# Patient Record
Sex: MALE | Race: WHITE | Employment: OTHER | URBAN - METROPOLITAN AREA
[De-identification: names, ages, dates, MRNs, and addresses within clinical notes are randomized per-mention and may not be internally consistent; named-entity substitution may affect disease eponyms.]

---

## 2023-08-22 ENCOUNTER — APPOINTMENT (OUTPATIENT)
Dept: GENERAL RADIOLOGY | Age: 83
DRG: 871 | End: 2023-08-22
Payer: MEDICARE

## 2023-08-22 ENCOUNTER — APPOINTMENT (OUTPATIENT)
Dept: CT IMAGING | Age: 83
DRG: 871 | End: 2023-08-22
Payer: MEDICARE

## 2023-08-22 ENCOUNTER — HOSPITAL ENCOUNTER (INPATIENT)
Age: 83
LOS: 2 days | Discharge: HOSPICE | DRG: 871 | End: 2023-08-24
Attending: STUDENT IN AN ORGANIZED HEALTH CARE EDUCATION/TRAINING PROGRAM | Admitting: INTERNAL MEDICINE
Payer: MEDICARE

## 2023-08-22 DIAGNOSIS — N17.9 ACUTE RENAL FAILURE, UNSPECIFIED ACUTE RENAL FAILURE TYPE (HCC): Primary | ICD-10-CM

## 2023-08-22 DIAGNOSIS — I48.91 ATRIAL FIBRILLATION WITH RVR (HCC): ICD-10-CM

## 2023-08-22 DIAGNOSIS — T83.511A URINARY TRACT INFECTION ASSOCIATED WITH INDWELLING URETHRAL CATHETER, INITIAL ENCOUNTER (HCC): ICD-10-CM

## 2023-08-22 DIAGNOSIS — N39.0 URINARY TRACT INFECTION ASSOCIATED WITH INDWELLING URETHRAL CATHETER, INITIAL ENCOUNTER (HCC): ICD-10-CM

## 2023-08-22 DIAGNOSIS — M86.9 OSTEOMYELITIS, UNSPECIFIED SITE, UNSPECIFIED TYPE (HCC): ICD-10-CM

## 2023-08-22 DIAGNOSIS — A41.9 SEPTICEMIA (HCC): ICD-10-CM

## 2023-08-22 PROBLEM — R65.21 SEPTIC SHOCK (HCC): Status: ACTIVE | Noted: 2023-08-22

## 2023-08-22 LAB
ALBUMIN SERPL-MCNC: 3.6 G/DL (ref 3.4–5)
ALBUMIN/GLOB SERPL: 0.9 {RATIO} (ref 1.1–2.2)
ALP SERPL-CCNC: 94 U/L (ref 40–129)
ALT SERPL-CCNC: 10 U/L (ref 10–40)
ANION GAP SERPL CALCULATED.3IONS-SCNC: 19 MMOL/L (ref 3–16)
ANION GAP SERPL CALCULATED.3IONS-SCNC: 22 MMOL/L (ref 3–16)
AST SERPL-CCNC: 22 U/L (ref 15–37)
BACTERIA URNS QL MICRO: ABNORMAL /HPF
BASE EXCESS BLDV CALC-SCNC: -6.4 MMOL/L (ref -3–3)
BASOPHILS # BLD: 0.1 K/UL (ref 0–0.2)
BASOPHILS NFR BLD: 0.3 %
BILIRUB SERPL-MCNC: 0.5 MG/DL (ref 0–1)
BILIRUB UR QL STRIP.AUTO: ABNORMAL
BUN SERPL-MCNC: 111 MG/DL (ref 7–20)
BUN SERPL-MCNC: 123 MG/DL (ref 7–20)
CALCIUM SERPL-MCNC: 9.5 MG/DL (ref 8.3–10.6)
CALCIUM SERPL-MCNC: 9.9 MG/DL (ref 8.3–10.6)
CHLORIDE SERPL-SCNC: 106 MMOL/L (ref 99–110)
CHLORIDE SERPL-SCNC: 107 MMOL/L (ref 99–110)
CK SERPL-CCNC: 235 U/L (ref 39–308)
CLARITY UR: ABNORMAL
CO2 BLDV-SCNC: 43 MMOL/L
CO2 SERPL-SCNC: 15 MMOL/L (ref 21–32)
CO2 SERPL-SCNC: 19 MMOL/L (ref 21–32)
COARSE GRAN CASTS #/AREA URNS LPF: ABNORMAL /LPF (ref 0–2)
COHGB MFR BLDV: 3.8 % (ref 0–1.5)
COLOR UR: ABNORMAL
CREAT SERPL-MCNC: 6 MG/DL (ref 0.8–1.3)
CREAT SERPL-MCNC: 6.4 MG/DL (ref 0.8–1.3)
CRP SERPL-MCNC: 102.6 MG/L (ref 0–5.1)
CRYSTALS URNS MICRO: ABNORMAL /HPF
DEPRECATED RDW RBC AUTO: 16.8 % (ref 12.4–15.4)
DO-HGB MFR BLDV: 3 %
EKG ATRIAL RATE: 127 BPM
EKG DIAGNOSIS: NORMAL
EKG Q-T INTERVAL: 392 MS
EKG QRS DURATION: 102 MS
EKG QTC CALCULATION (BAZETT): 474 MS
EKG R AXIS: -24 DEGREES
EKG T AXIS: 119 DEGREES
EKG VENTRICULAR RATE: 88 BPM
EOSINOPHIL # BLD: 0 K/UL (ref 0–0.6)
EOSINOPHIL NFR BLD: 0.1 %
EPI CELLS #/AREA URNS HPF: ABNORMAL /HPF (ref 0–5)
ERYTHROCYTE [SEDIMENTATION RATE] IN BLOOD BY WESTERGREN METHOD: 92 MM/HR (ref 0–20)
GENTAMICIN RAND EID SERPL-MCNC: ABNORMAL MG/L
GENTAMICIN TROUGH SERPL-MCNC: >10.1 UG/ML
GFR SERPLBLD CREATININE-BSD FMLA CKD-EPI: 8 ML/MIN/{1.73_M2}
GFR SERPLBLD CREATININE-BSD FMLA CKD-EPI: 9 ML/MIN/{1.73_M2}
GLUCOSE SERPL-MCNC: 152 MG/DL (ref 70–99)
GLUCOSE SERPL-MCNC: 156 MG/DL (ref 70–99)
GLUCOSE UR STRIP.AUTO-MCNC: 100 MG/DL
HCO3 BLDV-SCNC: 18.4 MMOL/L (ref 23–29)
HCT VFR BLD AUTO: 41.2 % (ref 40.5–52.5)
HGB BLD-MCNC: 13.1 G/DL (ref 13.5–17.5)
HGB UR QL STRIP.AUTO: ABNORMAL
INR PPP: 2.45 (ref 0.84–1.16)
KETONES UR STRIP.AUTO-MCNC: ABNORMAL MG/DL
LACTATE BLDV-SCNC: 1.6 MMOL/L (ref 0.4–1.9)
LEUKOCYTE ESTERASE UR QL STRIP.AUTO: ABNORMAL
LIPASE SERPL-CCNC: 63 U/L (ref 13–60)
LYMPHOCYTES # BLD: 1.1 K/UL (ref 1–5.1)
LYMPHOCYTES NFR BLD: 3.6 %
MAGNESIUM SERPL-MCNC: 3.1 MG/DL (ref 1.8–2.4)
MCH RBC QN AUTO: 30.8 PG (ref 26–34)
MCHC RBC AUTO-ENTMCNC: 31.8 G/DL (ref 31–36)
MCV RBC AUTO: 96.6 FL (ref 80–100)
METHGB MFR BLDV: 0 %
MONOCYTES # BLD: 0.8 K/UL (ref 0–1.3)
MONOCYTES NFR BLD: 2.6 %
NEUTROPHILS # BLD: 28.4 K/UL (ref 1.7–7.7)
NEUTROPHILS NFR BLD: 93.4 %
NITRITE UR QL STRIP.AUTO: NEGATIVE
NT-PROBNP SERPL-MCNC: 1313 PG/ML (ref 0–449)
O2 CT VFR BLDV CALC: 18 VOL %
O2 THERAPY: ABNORMAL
PCO2 BLDV: 33.5 MMHG (ref 40–50)
PH BLDV: 7.35 [PH] (ref 7.35–7.45)
PH UR STRIP.AUTO: 5 [PH] (ref 5–8)
PLATELET # BLD AUTO: 368 K/UL (ref 135–450)
PMV BLD AUTO: 7.9 FL (ref 5–10.5)
PO2 BLDV: 82.6 MMHG (ref 25–40)
POTASSIUM SERPL-SCNC: 4.9 MMOL/L (ref 3.5–5.1)
POTASSIUM SERPL-SCNC: 5.2 MMOL/L (ref 3.5–5.1)
PROT SERPL-MCNC: 7.7 G/DL (ref 6.4–8.2)
PROT UR STRIP.AUTO-MCNC: 300 MG/DL
PROTHROMBIN TIME: 26.5 SEC (ref 11.5–14.8)
RBC # BLD AUTO: 4.26 M/UL (ref 4.2–5.9)
RBC #/AREA URNS HPF: ABNORMAL /HPF (ref 0–4)
SAO2 % BLDV: 97 %
SARS-COV-2 RDRP RESP QL NAA+PROBE: NOT DETECTED
SODIUM SERPL-SCNC: 144 MMOL/L (ref 136–145)
SODIUM SERPL-SCNC: 144 MMOL/L (ref 136–145)
SP GR UR STRIP.AUTO: 1.02 (ref 1–1.03)
TROPONIN, HIGH SENSITIVITY: 105 NG/L (ref 0–22)
TROPONIN, HIGH SENSITIVITY: 108 NG/L (ref 0–22)
TROPONIN, HIGH SENSITIVITY: 142 NG/L (ref 0–22)
UA COMPLETE W REFLEX CULTURE PNL UR: YES
UA DIPSTICK W REFLEX MICRO PNL UR: YES
URATE SERPL-MCNC: 7.3 MG/DL (ref 3.5–7.2)
URN SPEC COLLECT METH UR: ABNORMAL
UROBILINOGEN UR STRIP-ACNC: 1 E.U./DL
WBC # BLD AUTO: 30.5 K/UL (ref 4–11)
WBC #/AREA URNS HPF: ABNORMAL /HPF (ref 0–5)

## 2023-08-22 PROCEDURE — 82570 ASSAY OF URINE CREATININE: CPT

## 2023-08-22 PROCEDURE — 87186 SC STD MICRODIL/AGAR DIL: CPT

## 2023-08-22 PROCEDURE — 87635 SARS-COV-2 COVID-19 AMP PRB: CPT

## 2023-08-22 PROCEDURE — 71045 X-RAY EXAM CHEST 1 VIEW: CPT

## 2023-08-22 PROCEDURE — 85610 PROTHROMBIN TIME: CPT

## 2023-08-22 PROCEDURE — 96365 THER/PROPH/DIAG IV INF INIT: CPT

## 2023-08-22 PROCEDURE — 83880 ASSAY OF NATRIURETIC PEPTIDE: CPT

## 2023-08-22 PROCEDURE — 82803 BLOOD GASES ANY COMBINATION: CPT

## 2023-08-22 PROCEDURE — 80170 ASSAY OF GENTAMICIN: CPT

## 2023-08-22 PROCEDURE — 82436 ASSAY OF URINE CHLORIDE: CPT

## 2023-08-22 PROCEDURE — 36415 COLL VENOUS BLD VENIPUNCTURE: CPT

## 2023-08-22 PROCEDURE — 96367 TX/PROPH/DG ADDL SEQ IV INF: CPT

## 2023-08-22 PROCEDURE — 85025 COMPLETE CBC W/AUTO DIFF WBC: CPT

## 2023-08-22 PROCEDURE — 99285 EMERGENCY DEPT VISIT HI MDM: CPT

## 2023-08-22 PROCEDURE — 2580000003 HC RX 258: Performed by: STUDENT IN AN ORGANIZED HEALTH CARE EDUCATION/TRAINING PROGRAM

## 2023-08-22 PROCEDURE — 84484 ASSAY OF TROPONIN QUANT: CPT

## 2023-08-22 PROCEDURE — 84133 ASSAY OF URINE POTASSIUM: CPT

## 2023-08-22 PROCEDURE — 2500000003 HC RX 250 WO HCPCS: Performed by: STUDENT IN AN ORGANIZED HEALTH CARE EDUCATION/TRAINING PROGRAM

## 2023-08-22 PROCEDURE — 2500000003 HC RX 250 WO HCPCS: Performed by: INTERNAL MEDICINE

## 2023-08-22 PROCEDURE — 87077 CULTURE AEROBIC IDENTIFY: CPT

## 2023-08-22 PROCEDURE — 6360000002 HC RX W HCPCS: Performed by: STUDENT IN AN ORGANIZED HEALTH CARE EDUCATION/TRAINING PROGRAM

## 2023-08-22 PROCEDURE — 86140 C-REACTIVE PROTEIN: CPT

## 2023-08-22 PROCEDURE — 84300 ASSAY OF URINE SODIUM: CPT

## 2023-08-22 PROCEDURE — 83735 ASSAY OF MAGNESIUM: CPT

## 2023-08-22 PROCEDURE — 82550 ASSAY OF CK (CPK): CPT

## 2023-08-22 PROCEDURE — 70450 CT HEAD/BRAIN W/O DYE: CPT

## 2023-08-22 PROCEDURE — 73630 X-RAY EXAM OF FOOT: CPT

## 2023-08-22 PROCEDURE — 6360000002 HC RX W HCPCS: Performed by: INTERNAL MEDICINE

## 2023-08-22 PROCEDURE — 2000000000 HC ICU R&B

## 2023-08-22 PROCEDURE — 87040 BLOOD CULTURE FOR BACTERIA: CPT

## 2023-08-22 PROCEDURE — 83690 ASSAY OF LIPASE: CPT

## 2023-08-22 PROCEDURE — 2580000003 HC RX 258: Performed by: INTERNAL MEDICINE

## 2023-08-22 PROCEDURE — 85652 RBC SED RATE AUTOMATED: CPT

## 2023-08-22 PROCEDURE — 81001 URINALYSIS AUTO W/SCOPE: CPT

## 2023-08-22 PROCEDURE — 80053 COMPREHEN METABOLIC PANEL: CPT

## 2023-08-22 PROCEDURE — 93010 ELECTROCARDIOGRAM REPORT: CPT | Performed by: INTERNAL MEDICINE

## 2023-08-22 PROCEDURE — 02HV33Z INSERTION OF INFUSION DEVICE INTO SUPERIOR VENA CAVA, PERCUTANEOUS APPROACH: ICD-10-PCS | Performed by: INTERNAL MEDICINE

## 2023-08-22 PROCEDURE — 84550 ASSAY OF BLOOD/URIC ACID: CPT

## 2023-08-22 PROCEDURE — 83605 ASSAY OF LACTIC ACID: CPT

## 2023-08-22 PROCEDURE — 87086 URINE CULTURE/COLONY COUNT: CPT

## 2023-08-22 PROCEDURE — 96375 TX/PRO/DX INJ NEW DRUG ADDON: CPT

## 2023-08-22 PROCEDURE — 93005 ELECTROCARDIOGRAM TRACING: CPT | Performed by: STUDENT IN AN ORGANIZED HEALTH CARE EDUCATION/TRAINING PROGRAM

## 2023-08-22 RX ORDER — GENTAMICIN SULFATE 40 MG/ML
80 INJECTION, SOLUTION INTRAMUSCULAR; INTRAVENOUS 3 TIMES DAILY
Status: ON HOLD | COMMUNITY
Start: 2023-08-17 | End: 2023-08-25 | Stop reason: HOSPADM

## 2023-08-22 RX ORDER — NOREPINEPHRINE BITARTRATE 0.06 MG/ML
1-100 INJECTION, SOLUTION INTRAVENOUS CONTINUOUS
Status: DISCONTINUED | OUTPATIENT
Start: 2023-08-22 | End: 2023-08-24 | Stop reason: HOSPADM

## 2023-08-22 RX ORDER — 0.9 % SODIUM CHLORIDE 0.9 %
1382 INTRAVENOUS SOLUTION INTRAVENOUS ONCE
Status: DISCONTINUED | OUTPATIENT
Start: 2023-08-22 | End: 2023-08-22

## 2023-08-22 RX ORDER — SODIUM CHLORIDE 9 MG/ML
INJECTION, SOLUTION INTRAVENOUS PRN
Status: DISCONTINUED | OUTPATIENT
Start: 2023-08-22 | End: 2023-08-24 | Stop reason: HOSPADM

## 2023-08-22 RX ORDER — CLOPIDOGREL BISULFATE 75 MG/1
75 TABLET ORAL DAILY
Status: DISCONTINUED | OUTPATIENT
Start: 2023-08-23 | End: 2023-08-24

## 2023-08-22 RX ORDER — DEXTROSE MONOHYDRATE 25 G/50ML
25 INJECTION, SOLUTION INTRAVENOUS ONCE
Status: DISCONTINUED | OUTPATIENT
Start: 2023-08-22 | End: 2023-08-24

## 2023-08-22 RX ORDER — ONDANSETRON 4 MG/1
4 TABLET, ORALLY DISINTEGRATING ORAL EVERY 8 HOURS PRN
Status: DISCONTINUED | OUTPATIENT
Start: 2023-08-22 | End: 2023-08-24 | Stop reason: HOSPADM

## 2023-08-22 RX ORDER — TRANEXAMIC ACID 100 MG/ML
INJECTION, SOLUTION INTRAVENOUS
Status: DISPENSED
Start: 2023-08-22 | End: 2023-08-23

## 2023-08-22 RX ORDER — POLYETHYLENE GLYCOL 3350 17 G/17G
17 POWDER, FOR SOLUTION ORAL DAILY PRN
Status: DISCONTINUED | OUTPATIENT
Start: 2023-08-22 | End: 2023-08-24 | Stop reason: HOSPADM

## 2023-08-22 RX ORDER — BISACODYL 10 MG
10 SUPPOSITORY, RECTAL RECTAL DAILY PRN
Status: ON HOLD | COMMUNITY
End: 2023-08-25 | Stop reason: HOSPADM

## 2023-08-22 RX ORDER — SODIUM CHLORIDE 0.9 % (FLUSH) 0.9 %
5-40 SYRINGE (ML) INJECTION EVERY 12 HOURS SCHEDULED
Status: DISCONTINUED | OUTPATIENT
Start: 2023-08-22 | End: 2023-08-24 | Stop reason: HOSPADM

## 2023-08-22 RX ORDER — SODIUM CHLORIDE 9 MG/ML
INJECTION, SOLUTION INTRAVENOUS CONTINUOUS
Status: DISCONTINUED | OUTPATIENT
Start: 2023-08-22 | End: 2023-08-22

## 2023-08-22 RX ORDER — POTASSIUM CHLORIDE 750 MG/1
10 CAPSULE, EXTENDED RELEASE ORAL DAILY
Status: ON HOLD | COMMUNITY
End: 2023-08-25 | Stop reason: HOSPADM

## 2023-08-22 RX ORDER — AMMONIUM LACTATE 12 G/100G
CREAM TOPICAL DAILY
Status: ON HOLD | COMMUNITY
End: 2023-08-25 | Stop reason: HOSPADM

## 2023-08-22 RX ORDER — 0.9 % SODIUM CHLORIDE 0.9 %
1000 INTRAVENOUS SOLUTION INTRAVENOUS ONCE
Status: DISCONTINUED | OUTPATIENT
Start: 2023-08-22 | End: 2023-08-24

## 2023-08-22 RX ORDER — AMLODIPINE BESYLATE 10 MG/1
10 TABLET ORAL DAILY
Status: ON HOLD | COMMUNITY
End: 2023-08-25 | Stop reason: HOSPADM

## 2023-08-22 RX ORDER — M-VIT,TX,IRON,MINS/CALC/FOLIC 27MG-0.4MG
1 TABLET ORAL DAILY
Status: ON HOLD | COMMUNITY
End: 2023-08-25 | Stop reason: HOSPADM

## 2023-08-22 RX ORDER — YOHIMBE BARK 500 MG
1 CAPSULE ORAL DAILY
Status: ON HOLD | COMMUNITY
End: 2023-08-25 | Stop reason: HOSPADM

## 2023-08-22 RX ORDER — ONDANSETRON 2 MG/ML
4 INJECTION INTRAMUSCULAR; INTRAVENOUS EVERY 6 HOURS PRN
Status: DISCONTINUED | OUTPATIENT
Start: 2023-08-22 | End: 2023-08-24 | Stop reason: HOSPADM

## 2023-08-22 RX ORDER — CLOPIDOGREL BISULFATE 75 MG/1
75 TABLET ORAL DAILY
Status: ON HOLD | COMMUNITY
End: 2023-08-25 | Stop reason: HOSPADM

## 2023-08-22 RX ORDER — ACETAMINOPHEN 325 MG/1
650 TABLET ORAL EVERY 6 HOURS PRN
Status: DISCONTINUED | OUTPATIENT
Start: 2023-08-22 | End: 2023-08-24 | Stop reason: HOSPADM

## 2023-08-22 RX ORDER — ACETAMINOPHEN 325 MG/1
650 TABLET ORAL EVERY 4 HOURS PRN
Status: ON HOLD | COMMUNITY
End: 2023-08-25 | Stop reason: HOSPADM

## 2023-08-22 RX ORDER — GABAPENTIN 800 MG/1
800 TABLET ORAL 3 TIMES DAILY
Status: ON HOLD | COMMUNITY
End: 2023-08-25 | Stop reason: HOSPADM

## 2023-08-22 RX ORDER — LINEZOLID 2 MG/ML
600 INJECTION, SOLUTION INTRAVENOUS EVERY 12 HOURS
Status: DISCONTINUED | OUTPATIENT
Start: 2023-08-23 | End: 2023-08-24

## 2023-08-22 RX ORDER — ALLOPURINOL 300 MG/1
300 TABLET ORAL DAILY
Status: ON HOLD | COMMUNITY
End: 2023-08-25 | Stop reason: HOSPADM

## 2023-08-22 RX ORDER — SPIRONOLACTONE 25 MG/1
25 TABLET ORAL DAILY
Status: ON HOLD | COMMUNITY
End: 2023-08-25 | Stop reason: HOSPADM

## 2023-08-22 RX ORDER — FAMOTIDINE 20 MG/1
20 TABLET, FILM COATED ORAL DAILY
Status: ON HOLD | COMMUNITY
End: 2023-08-25 | Stop reason: HOSPADM

## 2023-08-22 RX ORDER — FUROSEMIDE 40 MG/1
40 TABLET ORAL 2 TIMES DAILY
Status: ON HOLD | COMMUNITY
End: 2023-08-25 | Stop reason: HOSPADM

## 2023-08-22 RX ORDER — SODIUM CHLORIDE 0.9 % (FLUSH) 0.9 %
5-40 SYRINGE (ML) INJECTION PRN
Status: DISCONTINUED | OUTPATIENT
Start: 2023-08-22 | End: 2023-08-24 | Stop reason: HOSPADM

## 2023-08-22 RX ORDER — LORATADINE 10 MG/1
10 TABLET ORAL DAILY
Status: ON HOLD | COMMUNITY
End: 2023-08-25 | Stop reason: HOSPADM

## 2023-08-22 RX ORDER — VANCOMYCIN HYDROCHLORIDE 1 G/200ML
1000 INJECTION, SOLUTION INTRAVENOUS ONCE
Status: DISCONTINUED | OUTPATIENT
Start: 2023-08-22 | End: 2023-08-22

## 2023-08-22 RX ORDER — HYDROXYZINE HYDROCHLORIDE 25 MG/1
25 TABLET, FILM COATED ORAL 3 TIMES DAILY PRN
Status: ON HOLD | COMMUNITY
End: 2023-08-25 | Stop reason: HOSPADM

## 2023-08-22 RX ORDER — ACETAMINOPHEN 650 MG/1
650 SUPPOSITORY RECTAL EVERY 6 HOURS PRN
Status: DISCONTINUED | OUTPATIENT
Start: 2023-08-22 | End: 2023-08-24 | Stop reason: HOSPADM

## 2023-08-22 RX ORDER — DEXTROSE MONOHYDRATE 100 MG/ML
INJECTION, SOLUTION INTRAVENOUS CONTINUOUS PRN
Status: DISCONTINUED | OUTPATIENT
Start: 2023-08-22 | End: 2023-08-24 | Stop reason: HOSPADM

## 2023-08-22 RX ORDER — 0.9 % SODIUM CHLORIDE 0.9 %
1000 INTRAVENOUS SOLUTION INTRAVENOUS ONCE
Status: COMPLETED | OUTPATIENT
Start: 2023-08-22 | End: 2023-08-22

## 2023-08-22 RX ORDER — DILTIAZEM HYDROCHLORIDE 5 MG/ML
10 INJECTION INTRAVENOUS ONCE
Status: COMPLETED | OUTPATIENT
Start: 2023-08-22 | End: 2023-08-22

## 2023-08-22 RX ADMIN — Medication 5 MCG/MIN: at 14:52

## 2023-08-22 RX ADMIN — CEFTRIAXONE SODIUM 1000 MG: 1 INJECTION, POWDER, FOR SOLUTION INTRAMUSCULAR; INTRAVENOUS at 13:58

## 2023-08-22 RX ADMIN — DILTIAZEM HYDROCHLORIDE 10 MG: 5 INJECTION, SOLUTION INTRAVENOUS at 15:50

## 2023-08-22 RX ADMIN — AMIODARONE HYDROCHLORIDE 1 MG/MIN: 50 INJECTION, SOLUTION INTRAVENOUS at 19:05

## 2023-08-22 RX ADMIN — SODIUM BICARBONATE: 84 INJECTION, SOLUTION INTRAVENOUS at 19:00

## 2023-08-22 RX ADMIN — SODIUM CHLORIDE 1000 ML: 9 INJECTION, SOLUTION INTRAVENOUS at 12:45

## 2023-08-22 RX ADMIN — CALCIUM GLUCONATE 1000 MG: 98 INJECTION, SOLUTION INTRAVENOUS at 15:36

## 2023-08-22 RX ADMIN — VANCOMYCIN HYDROCHLORIDE 1250 MG: 1.25 INJECTION, POWDER, LYOPHILIZED, FOR SOLUTION INTRAVENOUS at 17:03

## 2023-08-22 RX ADMIN — DEXTROSE MONOHYDRATE 150 MG: 50 INJECTION, SOLUTION INTRAVENOUS at 18:38

## 2023-08-22 NOTE — CONSULTS
MD Ponce Lentz MD Normie Poncho, MD                Office: [phone]                      Fax: [phone]               [email or web address]                     Nephrology consult received. Full consult report will follow. Chart reviewed briefly, discussed with referring Dr. Robert Medina from ER. Initial-brief plan-  - Needs aggressive IV fluids,  - Refer to the orders for, for normal saline  - Levophed, to keep SBP >90 or MAP >65*   -Empiric antibiotics,- IV antibiotic: Vancomycin: trough goal <15, pharmacy team assisting.   -Follow-up blood culture, urine culture  -check urine lytes,  -phelps insertion needed for strict monitoring in critically ill patient  -no need for dialysis,    -at higher risk for decompensation, needing closer monitoring. Thank you for allowing us to participate in this patient's care. Please do not hesitate to contact us anytime. We will follow along with you. Mitchell Laguerre MD  [facility] [address]   [phone] or [employer].

## 2023-08-22 NOTE — H&P
HOSPITALISTS HISTORY AND PHYSICAL    8/22/2023 5:34 PM    Patient Information:  Marky Berrios is a 80 y.o. male [id number]  PCP:  No primary care provider on file. (Tel: None )    Chief complaint:    Chief Complaint   Patient presents with    Hypotension     Pt brought in by EMS from [facility]. Pt sent for low BP. Pt unknown mental status. History of Present Illness:  Marky Berrios is a 80 y.o. male who who was brought to hospital from nursing home for hypotension. Patient is nonverbal when I see him is moving all extremities. Per ED provider patient is baseline with dementia was speaks some words. Complaint usual possible UTI and hospitalized of the left foot patient is in septic shock started on pressors central line has been placed. ED provider did talk to family and states patient is a DNR CCA. REVIEW OF SYSTEMS:   Patient uable to answer . Past Medical History:  Afib, dementia         Medications:  No current facility-administered medications on file prior to encounter. Current Outpatient Medications on File Prior to Encounter   Medication Sig Dispense Refill    Lactobacillus (ACIDOPHILUS) 100 MG CAPS Take 1 capsule by mouth daily      allopurinol (ZYLOPRIM) 300 MG tablet Take 1 tablet by mouth daily      ammonium lactate (AMLACTIN) 12 % cream Apply topically daily Apply to left leg (not wound) topically one time per day.       amLODIPine (NORVASC) 10 MG tablet Take 1 tablet by mouth daily      clopidogrel (PLAVIX) 75 MG tablet Take 1 tablet by mouth daily      bisacodyl (DULCOLAX) 10 MG suppository Place 1 suppository rectally daily as needed for Constipation      apixaban (ELIQUIS) 5 MG TABS tablet Take 1 tablet by mouth 2 times daily      famotidine (PEPCID) 20 MG tablet Take 1 tablet by mouth daily      furosemide (LASIX) 40 MG tablet Take 1 tablet by mouth in the morning and 1 tablet in the

## 2023-08-22 NOTE — PROGRESS NOTES
Patient seen in ED, room 02. Admission Initiated but completed to and through Home Medications. Admission was completed based off paperwork provided by [facility]. The floor RN will need to complete the other required assessment items starting at Belongings in addition to the 4 Eyes Assessment, Immunizations, Covid Vaccines, Rights and Responsibilities, orientation to room, Plan of Care, Education/Learning Assessment, Education Plan, white board, height and weight, pain assessment and head to toe assessment. .  Patient is from [facility] and is being admitted for Septic Shock. Home Medication Reconciliation has been modified to match the paperwork provided by [facility] and has been completed via pharmacist Parag Mcclain. No family at bedside at this time. Per [facility] patient is on a Regular Diet with double protein portions. Patient is a DNR CCA.

## 2023-08-22 NOTE — PROGRESS NOTES
Pharmacy Home Medication Reconciliation Note    A medication reconciliation has been completed for Flor Lloyd 1940    [facility]    The patient's home medication list is as follows: No current facility-administered medications on file prior to encounter. Current Outpatient Medications on File Prior to Encounter   Medication Sig Dispense Refill    Lactobacillus (ACIDOPHILUS) 100 MG CAPS Take 1 capsule by mouth daily      allopurinol (ZYLOPRIM) 300 MG tablet Take 1 tablet by mouth daily      ammonium lactate (AMLACTIN) 12 % cream Apply topically daily Apply to left leg (not wound) topically one time per day. amLODIPine (NORVASC) 10 MG tablet Take 1 tablet by mouth daily      clopidogrel (PLAVIX) 75 MG tablet Take 1 tablet by mouth daily      bisacodyl (DULCOLAX) 10 MG suppository Place 1 suppository rectally daily as needed for Constipation      apixaban (ELIQUIS) 5 MG TABS tablet Take 1 tablet by mouth 2 times daily      famotidine (PEPCID) 20 MG tablet Take 1 tablet by mouth daily      furosemide (LASIX) 40 MG tablet Take 1 tablet by mouth in the morning and 1 tablet in the evening.      gabapentin (NEURONTIN) 800 MG tablet Take 1 tablet by mouth 3 times daily.       gentamicin (GARAMYCIN) 40 MG/ML injection Inject 2 mLs into the muscle in the morning, at noon, and at bedtime      hydrOXYzine HCl (ATARAX) 25 MG tablet Take 1 tablet by mouth 3 times daily as needed for Itching      loratadine (CLARITIN) 10 MG tablet Take 1 tablet by mouth daily      magnesium hydroxide (MILK OF MAGNESIA) 400 MG/5ML suspension Take 30 mLs by mouth daily as needed for Constipation      Multiple Vitamins-Minerals (THERAPEUTIC MULTIVITAMIN-MINERALS) tablet Take 1 tablet by mouth daily      potassium chloride (MICRO-K) 10 MEQ extended release capsule Take 1 capsule by mouth daily      collagenase (SANTYL) 250 UNIT/GM ointment Apply topically at bedtime Apply to right heel topically every night. spironolactone (ALDACTONE) 25 MG tablet Take 1 tablet by mouth daily      acetaminophen (TYLENOL) 325 MG tablet Take 2 tablets by mouth every 4 hours as needed for Pain         Of note, patient is on gentamicin 80mg IM TID x10 days, to end on 8/26. Timing of last doses updated.     Thank you,  Tiffany Fletcher, PharmD, BCCCP

## 2023-08-23 PROBLEM — M86.9 OSTEOMYELITIS (HCC): Status: ACTIVE | Noted: 2023-08-23

## 2023-08-23 PROBLEM — I10 ESSENTIAL HYPERTENSION: Status: ACTIVE | Noted: 2023-08-23

## 2023-08-23 PROBLEM — E66.3 OVERWEIGHT (BMI 25.0-29.9): Status: ACTIVE | Noted: 2023-08-23

## 2023-08-23 PROBLEM — N17.9 ACUTE RENAL FAILURE (HCC): Status: ACTIVE | Noted: 2023-08-23

## 2023-08-23 PROBLEM — N39.0 URINARY TRACT INFECTION ASSOCIATED WITH INDWELLING URETHRAL CATHETER (HCC): Status: ACTIVE | Noted: 2023-08-23

## 2023-08-23 PROBLEM — E78.2 MIXED HYPERLIPIDEMIA: Status: ACTIVE | Noted: 2023-08-23

## 2023-08-23 PROBLEM — E11.628 TYPE 2 DIABETES MELLITUS WITH LEFT DIABETIC FOOT INFECTION (HCC): Status: ACTIVE | Noted: 2023-08-23

## 2023-08-23 PROBLEM — R79.82 CRP ELEVATED: Status: ACTIVE | Noted: 2023-08-23

## 2023-08-23 PROBLEM — E87.29 HIGH ANION GAP METABOLIC ACIDOSIS: Status: ACTIVE | Noted: 2023-08-23

## 2023-08-23 PROBLEM — I48.20 CHRONIC ATRIAL FIBRILLATION (HCC): Status: ACTIVE | Noted: 2023-08-23

## 2023-08-23 PROBLEM — J44.9 CHRONIC OBSTRUCTIVE PULMONARY DISEASE (HCC): Status: ACTIVE | Noted: 2023-08-23

## 2023-08-23 PROBLEM — I48.91 ATRIAL FIBRILLATION WITH RVR (HCC): Status: ACTIVE | Noted: 2023-08-23

## 2023-08-23 PROBLEM — T83.511A URINARY TRACT INFECTION ASSOCIATED WITH INDWELLING URETHRAL CATHETER (HCC): Status: ACTIVE | Noted: 2023-08-23

## 2023-08-23 PROBLEM — N40.1 BENIGN PROSTATIC HYPERPLASIA WITH LOWER URINARY TRACT SYMPTOMS: Status: ACTIVE | Noted: 2023-08-23

## 2023-08-23 PROBLEM — L08.9 TYPE 2 DIABETES MELLITUS WITH LEFT DIABETIC FOOT INFECTION (HCC): Status: ACTIVE | Noted: 2023-08-23

## 2023-08-23 PROBLEM — A41.9 SEPTICEMIA (HCC): Status: ACTIVE | Noted: 2023-08-23

## 2023-08-23 PROBLEM — R70.0 ELEVATED ERYTHROCYTE SEDIMENTATION RATE: Status: ACTIVE | Noted: 2023-08-23

## 2023-08-23 LAB
ALBUMIN SERPL-MCNC: 3.3 G/DL (ref 3.4–5)
ALP SERPL-CCNC: 95 U/L (ref 40–129)
ALT SERPL-CCNC: 10 U/L (ref 10–40)
ANION GAP SERPL CALCULATED.3IONS-SCNC: 14 MMOL/L (ref 3–16)
ANION GAP SERPL CALCULATED.3IONS-SCNC: 18 MMOL/L (ref 3–16)
AST SERPL-CCNC: 19 U/L (ref 15–37)
BASOPHILS # BLD: 0.1 K/UL (ref 0–0.2)
BASOPHILS NFR BLD: 0.4 %
BILIRUB DIRECT SERPL-MCNC: <0.2 MG/DL (ref 0–0.3)
BILIRUB INDIRECT SERPL-MCNC: NORMAL MG/DL (ref 0–1)
BILIRUB SERPL-MCNC: 0.4 MG/DL (ref 0–1)
BUN SERPL-MCNC: 110 MG/DL (ref 7–20)
BUN SERPL-MCNC: 127 MG/DL (ref 7–20)
CALCIUM SERPL-MCNC: 8.3 MG/DL (ref 8.3–10.6)
CALCIUM SERPL-MCNC: 9 MG/DL (ref 8.3–10.6)
CHLORIDE SERPL-SCNC: 103 MMOL/L (ref 99–110)
CHLORIDE SERPL-SCNC: 103 MMOL/L (ref 99–110)
CHLORIDE UR-SCNC: <20 MMOL/L
CO2 SERPL-SCNC: 24 MMOL/L (ref 21–32)
CO2 SERPL-SCNC: 25 MMOL/L (ref 21–32)
CREAT SERPL-MCNC: 6.2 MG/DL (ref 0.8–1.3)
CREAT SERPL-MCNC: 6.5 MG/DL (ref 0.8–1.3)
CREAT UR-MCNC: 157.9 MG/DL (ref 39–259)
DEPRECATED RDW RBC AUTO: 16.9 % (ref 12.4–15.4)
EOSINOPHIL # BLD: 0.3 K/UL (ref 0–0.6)
EOSINOPHIL NFR BLD: 0.7 %
GFR SERPLBLD CREATININE-BSD FMLA CKD-EPI: 8 ML/MIN/{1.73_M2}
GFR SERPLBLD CREATININE-BSD FMLA CKD-EPI: 8 ML/MIN/{1.73_M2}
GLUCOSE SERPL-MCNC: 196 MG/DL (ref 70–99)
GLUCOSE SERPL-MCNC: 204 MG/DL (ref 70–99)
HCT VFR BLD AUTO: 35.8 % (ref 40.5–52.5)
HGB BLD-MCNC: 11.5 G/DL (ref 13.5–17.5)
LV EF: 68 %
LVEF MODALITY: NORMAL
LYMPHOCYTES # BLD: 0.9 K/UL (ref 1–5.1)
LYMPHOCYTES NFR BLD: 2.6 %
MAGNESIUM SERPL-MCNC: 2.7 MG/DL (ref 1.8–2.4)
MCH RBC QN AUTO: 30.7 PG (ref 26–34)
MCHC RBC AUTO-ENTMCNC: 32.3 G/DL (ref 31–36)
MCV RBC AUTO: 95.2 FL (ref 80–100)
MONOCYTES # BLD: 1.4 K/UL (ref 0–1.3)
MONOCYTES NFR BLD: 4.3 %
NEUTROPHILS # BLD: 31.1 K/UL (ref 1.7–7.7)
NEUTROPHILS NFR BLD: 92 %
PHOSPHATE SERPL-MCNC: 3.8 MG/DL (ref 2.5–4.9)
PLATELET # BLD AUTO: 428 K/UL (ref 135–450)
PMV BLD AUTO: 7.4 FL (ref 5–10.5)
POTASSIUM SERPL-SCNC: 3.7 MMOL/L (ref 3.5–5.1)
POTASSIUM SERPL-SCNC: 4.1 MMOL/L (ref 3.5–5.1)
POTASSIUM UR-SCNC: 76.5 MMOL/L
PROT SERPL-MCNC: 6.9 G/DL (ref 6.4–8.2)
RBC # BLD AUTO: 3.76 M/UL (ref 4.2–5.9)
SODIUM SERPL-SCNC: 142 MMOL/L (ref 136–145)
SODIUM SERPL-SCNC: 145 MMOL/L (ref 136–145)
SODIUM UR-SCNC: 26 MMOL/L
WBC # BLD AUTO: 33.8 K/UL (ref 4–11)

## 2023-08-23 PROCEDURE — 80069 RENAL FUNCTION PANEL: CPT

## 2023-08-23 PROCEDURE — 87186 SC STD MICRODIL/AGAR DIL: CPT

## 2023-08-23 PROCEDURE — 99223 1ST HOSP IP/OBS HIGH 75: CPT | Performed by: INTERNAL MEDICINE

## 2023-08-23 PROCEDURE — 6360000002 HC RX W HCPCS: Performed by: INTERNAL MEDICINE

## 2023-08-23 PROCEDURE — 6360000004 HC RX CONTRAST MEDICATION: Performed by: INTERNAL MEDICINE

## 2023-08-23 PROCEDURE — 99291 CRITICAL CARE FIRST HOUR: CPT | Performed by: INTERNAL MEDICINE

## 2023-08-23 PROCEDURE — 87077 CULTURE AEROBIC IDENTIFY: CPT

## 2023-08-23 PROCEDURE — 51702 INSERT TEMP BLADDER CATH: CPT

## 2023-08-23 PROCEDURE — 2580000003 HC RX 258: Performed by: INTERNAL MEDICINE

## 2023-08-23 PROCEDURE — 87070 CULTURE OTHR SPECIMN AEROBIC: CPT

## 2023-08-23 PROCEDURE — 83735 ASSAY OF MAGNESIUM: CPT

## 2023-08-23 PROCEDURE — 87205 SMEAR GRAM STAIN: CPT

## 2023-08-23 PROCEDURE — 2000000000 HC ICU R&B

## 2023-08-23 PROCEDURE — 2500000003 HC RX 250 WO HCPCS: Performed by: INTERNAL MEDICINE

## 2023-08-23 PROCEDURE — 85025 COMPLETE CBC W/AUTO DIFF WBC: CPT

## 2023-08-23 PROCEDURE — C8929 TTE W OR WO FOL WCON,DOPPLER: HCPCS

## 2023-08-23 PROCEDURE — 80076 HEPATIC FUNCTION PANEL: CPT

## 2023-08-23 RX ORDER — 0.9 % SODIUM CHLORIDE 0.9 %
500 INTRAVENOUS SOLUTION INTRAVENOUS ONCE
Status: COMPLETED | OUTPATIENT
Start: 2023-08-23 | End: 2023-08-23

## 2023-08-23 RX ORDER — LACTOBACILLUS RHAMNOSUS GG 10B CELL
1 CAPSULE ORAL 2 TIMES DAILY WITH MEALS
Status: DISCONTINUED | OUTPATIENT
Start: 2023-08-23 | End: 2023-08-24

## 2023-08-23 RX ORDER — SODIUM CHLORIDE 9 MG/ML
INJECTION, SOLUTION INTRAVENOUS CONTINUOUS
Status: DISCONTINUED | OUTPATIENT
Start: 2023-08-23 | End: 2023-08-24 | Stop reason: HOSPADM

## 2023-08-23 RX ORDER — 0.9 % SODIUM CHLORIDE 0.9 %
1000 INTRAVENOUS SOLUTION INTRAVENOUS ONCE
Status: COMPLETED | OUTPATIENT
Start: 2023-08-23 | End: 2023-08-23

## 2023-08-23 RX ADMIN — AMIODARONE HYDROCHLORIDE 0.5 MG/MIN: 50 INJECTION, SOLUTION INTRAVENOUS at 05:00

## 2023-08-23 RX ADMIN — PIPERACILLIN AND TAZOBACTAM 3375 MG: 3; .375 INJECTION, POWDER, LYOPHILIZED, FOR SOLUTION INTRAVENOUS at 13:43

## 2023-08-23 RX ADMIN — SODIUM CHLORIDE: 9 INJECTION, SOLUTION INTRAVENOUS at 13:39

## 2023-08-23 RX ADMIN — SODIUM CHLORIDE 500 ML: 9 INJECTION, SOLUTION INTRAVENOUS at 21:07

## 2023-08-23 RX ADMIN — PIPERACILLIN AND TAZOBACTAM 3375 MG: 3; .375 INJECTION, POWDER, LYOPHILIZED, FOR SOLUTION INTRAVENOUS at 22:41

## 2023-08-23 RX ADMIN — PERFLUTREN 1.5 ML: 6.52 INJECTION, SUSPENSION INTRAVENOUS at 10:24

## 2023-08-23 RX ADMIN — LINEZOLID 600 MG: 600 INJECTION, SOLUTION INTRAVENOUS at 12:11

## 2023-08-23 RX ADMIN — SODIUM CHLORIDE 1000 ML: 9 INJECTION, SOLUTION INTRAVENOUS at 10:50

## 2023-08-23 RX ADMIN — LINEZOLID 600 MG: 600 INJECTION, SOLUTION INTRAVENOUS at 00:15

## 2023-08-23 RX ADMIN — SODIUM BICARBONATE: 84 INJECTION, SOLUTION INTRAVENOUS at 06:09

## 2023-08-23 ASSESSMENT — PAIN SCALES - PAIN ASSESSMENT IN ADVANCED DEMENTIA (PAINAD)
NEGVOCALIZATION: 0
TOTALSCORE: 0
BODYLANGUAGE: 0
FACIALEXPRESSION: 0
BREATHING: 0
CONSOLABILITY: 0

## 2023-08-23 ASSESSMENT — ENCOUNTER SYMPTOMS
SHORTNESS OF BREATH: 0
EYE REDNESS: 0
EYE DISCHARGE: 0
ABDOMINAL PAIN: 0
CONSTIPATION: 0
BACK PAIN: 0
NAUSEA: 0
SINUS PAIN: 0
WHEEZING: 0
COUGH: 0
DIARRHEA: 0
SORE THROAT: 0
SINUS PRESSURE: 0
RHINORRHEA: 0

## 2023-08-23 ASSESSMENT — PAIN SCALES - GENERAL
PAINLEVEL_OUTOF10: 0
PAINLEVEL_OUTOF10: 0

## 2023-08-23 NOTE — PROGRESS NOTES
(mini-bag), 3,375 mg, IntraVENous, Q12H, Dale Delgado MD    lactobacillus (CULTURELLE) capsule 1 capsule, 1 capsule, Oral, BID WC, Dale Delgado MD    norepinephrine (LEVOPHED) 16 mg in sodium chloride 0.9 % 250 mL infusion, 1-100 mcg/min, IntraVENous, Continuous, Naty Black MD, Last Rate: 3.8 mL/hr at 23 1226, 4 mcg/min at 23 1226    sodium bicarbonate 8.4 % injection 50 mEq, 50 mEq, IntraVENous, Once, Alma Chin MD    insulin regular (HUMULIN R;NOVOLIN R) injection 5 Units, 5 Units, IntraVENous, Once, Alma Chin MD    dextrose bolus 10% 125 mL, 125 mL, IntraVENous, PRN **OR** dextrose bolus 10% 250 mL, 250 mL, IntraVENous, PRN, Alma Chin MD    glucagon (rDNA) injection 1 mg, 1 mg, SubCUTAneous, PRN, Alma Chin MD    dextrose 10 % infusion, , IntraVENous, Continuous PRN, Alma Chin MD    dextrose 50 % IV solution, 25 g, IntraVENous, Once, Alma Chin MD    sodium chloride 0.9 % bolus 1,000 mL, 1,000 mL, IntraVENous, Once, Eliana Gordon MD    sodium bicarbonate 150 mEq in dextrose 5 % 1,000 mL infusion, , IntraVENous, Continuous, Eliana Gordon MD, Last Rate: 100 mL/hr at 23 0609, New Bag at 23 0609    [COMPLETED] amiodarone (CORDARONE) 150 mg in dextrose 5 % 100 mL bolus, 150 mg, IntraVENous, Once, Stopped at 23 1859 **FOLLOWED BY** [] amiodarone (CORDARONE) 450 mg in dextrose 5 % 250 mL infusion, 1 mg/min, IntraVENous, Continuous, Last Rate: 16.7 mL/hr at 23 0446, 0.5 mg/min at 23 0446 **FOLLOWED BY** amiodarone (CORDARONE) 450 mg in dextrose 5 % 250 mL infusion, 0.5 mg/min, IntraVENous, Continuous, Eliana Gordon MD, Last Rate: 16.7 mL/hr at 23 0500, 0.5 mg/min at 23 0500    [Held by provider] apixaban (ELIQUIS) tablet 2.5 mg, 2.5 mg, Oral, BID, Eliana Gordon MD    [Held by provider] clopidogrel (PLAVIX) tablet 75 mg, 75 mg, Oral, Daily, Eliana Gordon MD    linezolid (ZYVOX) IVPB 600 found for: LABA1C  RPR:  No results found for: RPR  HIV:  No results found for: HIV  LIZ:  No results found for: ANATITER, LIZ  RF:  No results found for: RF  DSDNA:  No components found for: DNA  AMYLASE:  No results found for: AMYLASE  LIPASE:    Lab Results   Component Value Date/Time    LIPASE 63.0 08/22/2023 12:30 PM     Fibrinogen Level:  No components found for: FIB       BELOW MENTIONED RADIOLOGY STUDY RESULTS BY ME (AS NEEDED FOR MY EVALUATION AND MANAGEMENT). CT HEAD WO CONTRAST    Result Date: 8/22/2023  EXAMINATION: CT OF THE HEAD WITHOUT CONTRAST  8/22/2023 1:20 pm TECHNIQUE: CT of the head was performed without the administration of intravenous contrast. Automated exposure control, iterative reconstruction, and/or weight based adjustment of the mA/kV was utilized to reduce the radiation dose to as low as reasonably achievable. COMPARISON: None. HISTORY: ORDERING SYSTEM PROVIDED HISTORY: AMS TECHNOLOGIST PROVIDED HISTORY: Reason for exam:->AMS Has a \"code stroke\" or \"stroke alert\" been called? ->No Decision Support Exception - unselect if not a suspected or confirmed emergency medical condition->Emergency Medical Condition (MA) Reason for Exam: AMS FINDINGS: BRAIN/VENTRICLES: There is no acute intracranial hemorrhage, mass effect or midline shift. No abnormal extra-axial fluid collection. The gray-white differentiation is maintained without evidence of an acute infarct. There is no evidence of hydrocephalus. SINUSES: The visualized paranasal sinuses and mastoid air cells demonstrate no acute abnormality. Cerumen in the left external auditory canal. SOFT TISSUES/SKULL:  No acute abnormality of the visualized skull or soft tissues. No acute intracranial abnormality. Imaging: [unfilled]         ======================================================================  Please note that this chart entry has been generated using voice recognition software, mainly.   So please excuse brevity

## 2023-08-23 NOTE — CONSULTS
Podiatric surgery consult note        CHIEF COMPLAINT:  \"  Chief Complaint   Patient presents with    Hypotension     Pt brought in by EMS from [facility]. Pt sent for low BP. Pt unknown mental status. \"    Reason for Admission:  Septicemia ([address]) [A41.9]  Atrial fibrillation with RVR ([address]) [I48.91]  Septic shock ([address] ) [A41.9, R65.21]  Acute renal failure, unspecified acute renal failure type ([address] ) [N17.9]  Urinary tract infection associated with indwelling urethral catheter, initial encounter ([address] ) [U71.552R, N39.0]  Osteomyelitis, unspecified site, unspecified type ([address] [facility]) [M86.9]    History Obtained From:  electronic medical record    HISTORY OF PRESENT ILLNESS:      The patient is a 80 y.o. male with significant past medical history listed below who presents with necrotic ulceration.   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 ([address])     Benign prostatic hyperplasia     CHF (congestive heart failure) (HCC)     Chronic gout, unspecified, without tophus (tophi)     COPD (chronic obstructive pulmonary disease) ([address])     Diabetes mellitus due to underlying condition with diabetic neuropathic arthropathy (HCC)     Gastro-esophageal reflux disease without esophagitis     Hyperlipidemia     Hypertension     Longstanding persistent atrial fibrillation (HCC)     Malignant neoplasm of prostate (HCC)     Neuromuscular dysfunction of bladder, unspecified     Obstructive and reflux uropathy     Peripheral vascular disease, unspecified (HCC)     Polyneuropathy, unspecified     Unspecified atrial flutter (HCC)     Unspecified protein-calorie malnutrition (HCC)      Past Surgical History:        Procedure Laterality Date    OTHER SURGICAL HISTORY      acquired abscence of right leg below knee       Current Facility-Administered Medications:     piperacillin-tazobactam (ZOSYN) 3,375 mg in sodium chloride 0.9 % 50 mL IVPB (mini-bag), 3,375 mg, heel ulceration with osteomyelitis  -Definitive treatment is 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 Offloading heel boot. Thanks for the opportunity to participate in this patient's care.      Rk Matta DPM

## 2023-08-23 NOTE — CONSULTS
Infectious Diseases   Consult Note        Admission Date: 8/22/2023  Hospital Day: Hospital Day: 2   Attending: Angy Marti MD  Date of service: 8/23/23     Reason for admission: Septicemia [facility]) [A41.9]  Atrial fibrillation with RVR ([address]) [I48.91]  Septic shock ([address]) [A41.9, R65.21]  Acute renal failure, unspecified acute renal failure type ([address]) [N17.9]  Urinary tract infection associated with indwelling urethral catheter, initial encounter ([address]) [R73.410H, N39.0]  Osteomyelitis, unspecified site, unspecified type [facility]) [M86.9]    Chief complaint/ Reason for consult: Septic shock    Microbiology:        I have reviewed allavailable micro lab data and cultures    Blood culture (2/2) - collected on 8/22/2023: In process    Urine culture  - collected on 8/22/2023: Greater than 100,000 CFU per mL of Enterococcus faecalis      Antibiotics and immunizations:       Current antibiotics: All antibiotics and their doses were reviewed by me    Recent Abx Admin                     linezolid (ZYVOX) IVPB 600 mg (mg) 600 mg New Bag 08/23/23 0015    vancomycin (VANCOCIN) 1,250 mg in sodium chloride 0.9 % 250 mL IVPB (Ahro4Uzy) (mg) 1,250 mg New Bag 08/22/23 1703    cefTRIAXone (ROCEPHIN) 1,000 mg in sodium chloride 0.9 % 50 mL IVPB (mini-bag) (mg) 1,000 mg New Bag 08/22/23 1358                      Immunization History: All immunization history was reviewed by me today. There is no immunization history on file for this patient. Known drug allergies: All allergies were reviewed and updated    No Known Allergies    Social history:     Social History:  All social andepidemiologic history was reviewed and updated by me today as needed. Tobacco use:   reports that he has been smoking cigarettes. He does not have any smokeless tobacco history on file. Alcohol use:  Alcohol use questions deferred to the physician. Currently lives in: [facility]  Drug use questions deferred to the physician.      MIKAL to light. Neck:      Thyroid: No thyromegaly. Cardiovascular:      Rate and Rhythm: Normal rate and regular rhythm. Heart sounds: Normal heart sounds. No murmur heard. No friction rub. Pulmonary:      Effort: No respiratory distress. Breath sounds: No stridor. No wheezing or rales. Abdominal:      General: Bowel sounds are normal.      Palpations: Abdomen is soft. Tenderness: There is no abdominal tenderness. There is no guarding or rebound. Musculoskeletal:         General: No swelling, tenderness or deformity. Normal range of motion. Cervical back: Normal range of motion and neck supple. Right lower leg: No edema. Left lower leg: No edema. Lymphadenopathy:      Cervical: No cervical adenopathy. Skin:     General: Skin is warm and dry. Coloration: Skin is not jaundiced. Findings: No bruising, erythema or rash. Comments: Left heel ulceration and foul-smelling discharge noted. 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 Mental Status: He is alert and oriented to person, place, and time. Mental status is at baseline. Motor: No abnormal muscle tone. Psychiatric:         Mood and Affect: Mood normal.         Behavior: Behavior normal.         Lines and drains: All vascular access sites are healthy with no local erythema, discharge or tenderness. Intake and output:     I/O last 3 completed shifts: In: 2646.1 [I.V.:1944.9; IV Piggyback:701.2]  Out: 375 [Urine:375]    Lab Data:   All available labs were reviewed by me today.      CBC:   Recent Labs     08/22/23  1230 08/23/23  0545   WBC 30.5* 33.8*   RBC 4.26 3.76*   HGB 13.1* 11.5*   HCT 41.2 35.8*    428   MCV 96.6 95.2   MCH 30.8 30.7   MCHC 31.8 32.3   RDW 16.8* 16.9*        BMP:  Recent Labs     08/22/23  1230 08/22/23  1724 08/23/23  0545    144 145   K 5.2* 4.9 4.1    107 103

## 2023-08-23 NOTE — CONSULTS
due to patient factors. Minimally verbal.      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)     Benign prostatic hyperplasia     CHF (congestive heart failure) (HCC)     Chronic gout, unspecified, without tophus (tophi)     COPD (chronic obstructive pulmonary disease) ([address])     Diabetes mellitus due to underlying condition with diabetic neuropathic arthropathy (HCC)     Gastro-esophageal reflux disease without esophagitis     Hyperlipidemia     Hypertension     Longstanding persistent atrial fibrillation (HCC)     Malignant neoplasm of prostate (HCC)     Neuromuscular dysfunction of bladder, unspecified     Obstructive and reflux uropathy     Peripheral vascular disease, unspecified (HCC)     Polyneuropathy, unspecified     Unspecified atrial flutter ([address])     Unspecified protein-calorie malnutrition ([address])        PAST SURGICAL HISTORY:   Past Surgical History:   Procedure Laterality Date    OTHER SURGICAL HISTORY      acquired abscence of right leg below knee       SOCIAL HISTORY:   Social History     Tobacco Use    Smoking status: Some Days     Types: Cigarettes   Vaping Use    Vaping Use: Unknown   Substance Use Topics    Alcohol use: Defer    Drug use: Defer       FAMILY HISTORY:   Family History   Family history unknown: Yes       MEDICATIONS:     No current facility-administered medications on file prior to encounter. Current Outpatient Medications on File Prior to Encounter   Medication Sig Dispense Refill    Lactobacillus (ACIDOPHILUS) 100 MG CAPS Take 1 capsule by mouth daily      allopurinol (ZYLOPRIM) 300 MG tablet Take 1 tablet by mouth daily      ammonium lactate (AMLACTIN) 12 % cream Apply topically daily Apply to left leg (not wound) topically one time per day.       amLODIPine (NORVASC) 10 MG tablet Take 1 tablet by mouth daily      clopidogrel (PLAVIX) 75 MG tablet Take 1 tablet by mouth daily      bisacodyl (DULCOLAX) 10 MG addressed with the provider for clarification.

## 2023-08-23 NOTE — CARE COORDINATION
Discharge Planning Note:    CM called and LVM for Jody at [facility] to verify pt's residency status and barriers to returning. Return call back requested.      Electronically signed by Rosa Singleton RN on 8/23/2023 at 9:00 AM

## 2023-08-23 NOTE — CARE COORDINATION
Case Management Assessment  Initial Evaluation    Date/Time of Evaluation: 8/23/2023 3:42 PM  Assessment Completed by: Joao Sanford RN    If patient is discharged prior to next notation, then this note serves as note for discharge by case management. Patient Name: Kai Patel                   YOB: 1940  Diagnosis: Septicemia ([address]) [A41.9]  Atrial fibrillation with RVR ([address]) [I48.91]  Septic shock ([address]) [A41.9, R65.21]  Acute renal failure, unspecified acute renal failure type ([address]) [N17.9]  Urinary tract infection associated with indwelling urethral catheter, initial encounter ([address]) [W56.145F, N39.0]  Osteomyelitis, unspecified site, unspecified type [facility]) [M86.9]                   Date / Time: 8/22/2023 12:13 PM    Patient Admission Status: Inpatient   Readmission Risk (Low < 19, Mod (19-27), High > 27): Readmission Risk Score: 16.8    Current PCP: No primary care provider on file. PCP verified by CM? Yes (facility attending)    Chart Reviewed: Yes      History Provided by: Medical Record, Other (see comment) ([facility])  Patient Orientation: Unable to Assess    Patient Cognition: Dementia / Early Alzheimer's    Hospitalization in the last 30 days (Readmission):  No    If yes, Readmission Assessment in CM Navigator will be completed. Advance Directives:      Code Status: DNR-CCA   Patient's Primary Decision Maker is: Legal Next of Kin    Primary Decision Maker: MiretaNatalie hansen - Spouse - [phone]    Secondary Decision Maker: Glenda Fields - Child - [phone]    Supplemental (Other) Decision Maker: Porter Ramírez - [phone]    Discharge Planning:    Patient expects to discharge to: Long-term care  Plan for transportation at discharge:  Other (see comment) (facility)    Financial    Payor: Carlyn Jo / Plan: [address] [place] [address] / Product Type: *No Product type* /         Current Nursing Home Information:  Patient admitted from:  [address]

## 2023-08-23 NOTE — PROGRESS NOTES
4 Eyes Skin Assessment     NAME:  Guadalupe Iqbal  YOB: 1940  MEDICAL RECORD NUMBER:  [id number]    The patient is being assessed for  Admission    I agree that at least one RN has performed a thorough Head to Toe Skin Assessment on the patient. ALL assessment sites listed below have been assessed. Areas assessed by both nurses:    Head, Face, Ears, Shoulders, Back, Chest, Arms, Elbows, Hands, Sacrum. Buttock, Coccyx, Ischium, Legs. Feet and Heels, and Under Medical Devices         Does the Patient have a Wound? Yes wound(s) were present on assessment.  LDA wound assessment was Initiated and completed by RN       Albert Prevention initiated by RN: Yes  Wound Care Orders initiated by RN: Yes    Pressure Injury (Stage 3,4, Unstageable, DTI, NWPT, and Complex wounds) if present, place Wound referral order by RN under : Yes    New Ostomies, if present place, Ostomy referral order under : No     Nurse 1 eSignature: Electronically signed by Bev Carlin RN on 8/23/23 at 5:03 AM EDT    **SHARE this note so that the co-signing nurse can place an eSignature**    Nurse 2 eSignature: Electronically signed by Preet Vaughn RN on 8/23/23 at 5:03 AM EDT

## 2023-08-24 ENCOUNTER — APPOINTMENT (OUTPATIENT)
Dept: ULTRASOUND IMAGING | Age: 83
DRG: 871 | End: 2023-08-24
Payer: MEDICARE

## 2023-08-24 ENCOUNTER — HOSPITAL ENCOUNTER (INPATIENT)
Age: 83
LOS: 1 days | Discharge: HOSPICE | End: 2023-08-25
Attending: INTERNAL MEDICINE | Admitting: INTERNAL MEDICINE
Payer: MEDICARE

## 2023-08-24 VITALS
DIASTOLIC BLOOD PRESSURE: 80 MMHG | HEIGHT: 67 IN | RESPIRATION RATE: 16 BRPM | OXYGEN SATURATION: 99 % | BODY MASS INDEX: 28.89 KG/M2 | SYSTOLIC BLOOD PRESSURE: 127 MMHG | WEIGHT: 184.08 LBS | TEMPERATURE: 98 F | HEART RATE: 155 BPM

## 2023-08-24 PROBLEM — A41.9 SEPSIS (HCC): Status: ACTIVE | Noted: 2023-08-24

## 2023-08-24 LAB
ALBUMIN SERPL-MCNC: 2.7 G/DL (ref 3.4–5)
ALBUMIN/GLOB SERPL: 0.9 {RATIO} (ref 1.1–2.2)
ALP SERPL-CCNC: 87 U/L (ref 40–129)
ALT SERPL-CCNC: 8 U/L (ref 10–40)
ANION GAP SERPL CALCULATED.3IONS-SCNC: 15 MMOL/L (ref 3–16)
AST SERPL-CCNC: 20 U/L (ref 15–37)
BACTERIA UR CULT: ABNORMAL
BASOPHILS # BLD: 0.1 K/UL (ref 0–0.2)
BASOPHILS NFR BLD: 0.2 %
BILIRUB SERPL-MCNC: 0.7 MG/DL (ref 0–1)
BUN SERPL-MCNC: 100 MG/DL (ref 7–20)
CALCIUM SERPL-MCNC: 8.5 MG/DL (ref 8.3–10.6)
CHLORIDE SERPL-SCNC: 105 MMOL/L (ref 99–110)
CO2 SERPL-SCNC: 24 MMOL/L (ref 21–32)
CREAT SERPL-MCNC: 5.8 MG/DL (ref 0.8–1.3)
DEPRECATED RDW RBC AUTO: 16.1 % (ref 12.4–15.4)
EOSINOPHIL # BLD: 0.8 K/UL (ref 0–0.6)
EOSINOPHIL NFR BLD: 2.8 %
GFR SERPLBLD CREATININE-BSD FMLA CKD-EPI: 9 ML/MIN/{1.73_M2}
GLUCOSE BLD-MCNC: 138 MG/DL (ref 70–99)
GLUCOSE SERPL-MCNC: 163 MG/DL (ref 70–99)
HCT VFR BLD AUTO: 34 % (ref 40.5–52.5)
HGB BLD-MCNC: 11.4 G/DL (ref 13.5–17.5)
LYMPHOCYTES # BLD: 0.5 K/UL (ref 1–5.1)
LYMPHOCYTES NFR BLD: 1.6 %
MAGNESIUM SERPL-MCNC: 2.1 MG/DL (ref 1.8–2.4)
MCH RBC QN AUTO: 31.5 PG (ref 26–34)
MCHC RBC AUTO-ENTMCNC: 33.5 G/DL (ref 31–36)
MCV RBC AUTO: 94.3 FL (ref 80–100)
MONOCYTES # BLD: 0.5 K/UL (ref 0–1.3)
MONOCYTES NFR BLD: 1.8 %
NEUTROPHILS # BLD: 27.9 K/UL (ref 1.7–7.7)
NEUTROPHILS NFR BLD: 93.6 %
ORGANISM: ABNORMAL
PERFORMED ON: ABNORMAL
PHOSPHATE SERPL-MCNC: 3.2 MG/DL (ref 2.5–4.9)
PLATELET # BLD AUTO: 386 K/UL (ref 135–450)
PMV BLD AUTO: 7.3 FL (ref 5–10.5)
POTASSIUM SERPL-SCNC: 3.9 MMOL/L (ref 3.5–5.1)
PROT SERPL-MCNC: 5.8 G/DL (ref 6.4–8.2)
RBC # BLD AUTO: 3.61 M/UL (ref 4.2–5.9)
SODIUM SERPL-SCNC: 144 MMOL/L (ref 136–145)
WBC # BLD AUTO: 29.8 K/UL (ref 4–11)

## 2023-08-24 PROCEDURE — 83735 ASSAY OF MAGNESIUM: CPT

## 2023-08-24 PROCEDURE — 6360000002 HC RX W HCPCS: Performed by: INTERNAL MEDICINE

## 2023-08-24 PROCEDURE — 80053 COMPREHEN METABOLIC PANEL: CPT

## 2023-08-24 PROCEDURE — 99291 CRITICAL CARE FIRST HOUR: CPT | Performed by: INTERNAL MEDICINE

## 2023-08-24 PROCEDURE — 2580000003 HC RX 258: Performed by: INTERNAL MEDICINE

## 2023-08-24 PROCEDURE — 94760 N-INVAS EAR/PLS OXIMETRY 1: CPT

## 2023-08-24 PROCEDURE — 85025 COMPLETE CBC W/AUTO DIFF WBC: CPT

## 2023-08-24 PROCEDURE — 2500000003 HC RX 250 WO HCPCS: Performed by: STUDENT IN AN ORGANIZED HEALTH CARE EDUCATION/TRAINING PROGRAM

## 2023-08-24 PROCEDURE — 1250000000 HC SEMI PRIVATE HOSPICE R&B

## 2023-08-24 PROCEDURE — 2500000003 HC RX 250 WO HCPCS: Performed by: INTERNAL MEDICINE

## 2023-08-24 PROCEDURE — 84100 ASSAY OF PHOSPHORUS: CPT

## 2023-08-24 RX ORDER — HEPARIN SODIUM 1000 [USP'U]/ML
4000 INJECTION, SOLUTION INTRAVENOUS; SUBCUTANEOUS PRN
Status: DISCONTINUED | OUTPATIENT
Start: 2023-08-24 | End: 2023-08-24 | Stop reason: HOSPADM

## 2023-08-24 RX ORDER — MORPHINE SULFATE 4 MG/ML
4 INJECTION, SOLUTION INTRAMUSCULAR; INTRAVENOUS
Status: DISCONTINUED | OUTPATIENT
Start: 2023-08-24 | End: 2023-08-25 | Stop reason: HOSPADM

## 2023-08-24 RX ORDER — MORPHINE SULFATE 4 MG/ML
4 INJECTION, SOLUTION INTRAMUSCULAR; INTRAVENOUS
Status: DISCONTINUED | OUTPATIENT
Start: 2023-08-24 | End: 2023-08-24 | Stop reason: HOSPADM

## 2023-08-24 RX ORDER — DILTIAZEM HYDROCHLORIDE 5 MG/ML
10 INJECTION INTRAVENOUS ONCE
Status: COMPLETED | OUTPATIENT
Start: 2023-08-24 | End: 2023-08-24

## 2023-08-24 RX ORDER — LORAZEPAM 2 MG/ML
4 INJECTION INTRAMUSCULAR
Status: DISCONTINUED | OUTPATIENT
Start: 2023-08-24 | End: 2023-08-25 | Stop reason: HOSPADM

## 2023-08-24 RX ORDER — LORAZEPAM 2 MG/ML
4 INJECTION INTRAMUSCULAR
Status: CANCELLED | OUTPATIENT
Start: 2023-08-24

## 2023-08-24 RX ORDER — LORAZEPAM 2 MG/ML
4 INJECTION INTRAMUSCULAR
Status: DISCONTINUED | OUTPATIENT
Start: 2023-08-24 | End: 2023-08-24 | Stop reason: HOSPADM

## 2023-08-24 RX ORDER — HEPARIN SODIUM 10000 [USP'U]/100ML
5-30 INJECTION, SOLUTION INTRAVENOUS CONTINUOUS
Status: DISCONTINUED | OUTPATIENT
Start: 2023-08-24 | End: 2023-08-24

## 2023-08-24 RX ORDER — MORPHINE SULFATE 4 MG/ML
4 INJECTION, SOLUTION INTRAMUSCULAR; INTRAVENOUS
Status: CANCELLED | OUTPATIENT
Start: 2023-08-24

## 2023-08-24 RX ORDER — HEPARIN SODIUM 1000 [USP'U]/ML
2000 INJECTION, SOLUTION INTRAVENOUS; SUBCUTANEOUS PRN
Status: DISCONTINUED | OUTPATIENT
Start: 2023-08-24 | End: 2023-08-24 | Stop reason: HOSPADM

## 2023-08-24 RX ADMIN — SODIUM CHLORIDE: 9 INJECTION, SOLUTION INTRAVENOUS at 12:06

## 2023-08-24 RX ADMIN — LINEZOLID 600 MG: 600 INJECTION, SOLUTION INTRAVENOUS at 00:44

## 2023-08-24 RX ADMIN — PIPERACILLIN AND TAZOBACTAM 3375 MG: 3; .375 INJECTION, POWDER, LYOPHILIZED, FOR SOLUTION INTRAVENOUS at 12:11

## 2023-08-24 RX ADMIN — SODIUM CHLORIDE, PRESERVATIVE FREE 10 ML: 5 INJECTION INTRAVENOUS at 08:51

## 2023-08-24 RX ADMIN — MORPHINE SULFATE 4 MG: 4 INJECTION, SOLUTION INTRAMUSCULAR; INTRAVENOUS at 21:54

## 2023-08-24 RX ADMIN — LORAZEPAM 4 MG: 2 INJECTION INTRAMUSCULAR; INTRAVENOUS at 16:44

## 2023-08-24 RX ADMIN — AMIODARONE HYDROCHLORIDE 0.5 MG/MIN: 50 INJECTION, SOLUTION INTRAVENOUS at 01:03

## 2023-08-24 RX ADMIN — MORPHINE SULFATE 4 MG: 4 INJECTION, SOLUTION INTRAMUSCULAR; INTRAVENOUS at 17:27

## 2023-08-24 RX ADMIN — DILTIAZEM HYDROCHLORIDE 10 MG: 5 INJECTION INTRAVENOUS at 08:47

## 2023-08-24 RX ADMIN — MORPHINE SULFATE 4 MG: 4 INJECTION, SOLUTION INTRAMUSCULAR; INTRAVENOUS at 14:52

## 2023-08-24 RX ADMIN — LORAZEPAM 4 MG: 2 INJECTION INTRAMUSCULAR; INTRAVENOUS at 23:22

## 2023-08-24 RX ADMIN — AMIODARONE HYDROCHLORIDE 0.5 MG/MIN: 50 INJECTION, SOLUTION INTRAVENOUS at 16:16

## 2023-08-24 RX ADMIN — Medication 7 MCG/MIN: at 12:09

## 2023-08-24 ASSESSMENT — PAIN SCALES - GENERAL
PAINLEVEL_OUTOF10: 0
PAINLEVEL_OUTOF10: 0
PAINLEVEL_OUTOF10: 2
PAINLEVEL_OUTOF10: 0
PAINLEVEL_OUTOF10: 0

## 2023-08-24 ASSESSMENT — PAIN SCALES - PAIN ASSESSMENT IN ADVANCED DEMENTIA (PAINAD)
CONSOLABILITY: 0
BREATHING: 0
TOTALSCORE: 0
BREATHING: 0
CONSOLABILITY: 0
BODYLANGUAGE: 0
TOTALSCORE: 0
NEGVOCALIZATION: 0
NEGVOCALIZATION: 0
BREATHING: 0
CONSOLABILITY: 0
BODYLANGUAGE: 0
BREATHING: 0
FACIALEXPRESSION: 0
BODYLANGUAGE: 0
BODYLANGUAGE: 0
NEGVOCALIZATION: 0
TOTALSCORE: 0
TOTALSCORE: 0
FACIALEXPRESSION: 0
CONSOLABILITY: 0
FACIALEXPRESSION: 0
FACIALEXPRESSION: 0
NEGVOCALIZATION: 0

## 2023-08-24 NOTE — CONSULTS
PALLIATIVE MEDICINE CONSULTATION     Patient name:Jameel Cary   IMP:[id number]    :1940  Room/Bed:ICU-3914/3914-01   LOS: 2 days         Date of consult:2023    Consult Information  Palliative Medicine Consult performed by: BECCA Menezes CNP, CNP    Inpatient consult to Palliative Care  Consult performed by: BECCA Menezes CNP  Consult ordered by: Lyndon Hartman MD  Reason for consult: [address] [place] [place] and code status      Inpatient consult to Palliative Care  Consult performed by: BECCA Menezes CNP  Consult ordered by: João Hair MD             ASSESSMENT/RECOMMENDATIONS     80 y.o. male with AMS in septic shock      Symptom Management:  Septic Shock- hypotensive, rising Cr and AF with RVR   Goals of Care- pt not responding today talked to his daughter Tonio Blackwell by phone to [facility] [address] [facility]. Daughter states that she is HCPOA she states that pt is [facility] at baseline and that the only reason she agreed to hospital admission when LTC called her was because ATB have rapidly turned him around in the past. She understands pts poor prognosis and multi-organs effected. She wants to switch to comfort care focus with plan for Hospice and return back to LTC     Patient/Family Goals of Care :    pt not responding today talked to his daughter Tonio Blackwell by phone to [facility] [address] [facility] Daughter states that she is HCPOA she states that pt is [facility] at baseline and that the only reason she agreed to hospital admission when LTC called her was because ATB have rapidly turned him around in the past. She understands pts poor prognosis and multi-organs effected. She wants to switch to comfort care focus with plan for Hospice and return back to LTC     Disposition/Discharge Plan:   pending    Advance Directives:       The patient has the following advanced directives on file:  [facility] ACP-Advance Directive ACP-Power of     Not on File Filed on 23

## 2023-08-24 NOTE — PROGRESS NOTES
Responded to referral made by attending RN, Flakita Erwin, at the request of patient's family for visit from a  and [facility] [address]. Contacted Father Benn Ahumada from [facility] who will be stopping by this evening to visit with family and anoint patient. Confirmed visit with attending RN.

## 2023-08-24 NOTE — PROGRESS NOTES
MD Lindsay    dextrose 10 % infusion, , IntraVENous, Continuous PRN, Carlos Everett MD    dextrose 50 % IV solution, 25 g, IntraVENous, Once, Carlos Everett MD    sodium chloride 0.9 % bolus 1,000 mL, 1,000 mL, IntraVENous, Once, Danae Mederos MD    [COMPLETED] amiodarone (CORDARONE) 150 mg in dextrose 5 % 100 mL bolus, 150 mg, IntraVENous, Once, Stopped at 23 1859 **FOLLOWED BY** [] amiodarone (CORDARONE) 450 mg in dextrose 5 % 250 mL infusion, 1 mg/min, IntraVENous, Continuous, Last Rate: 16.7 mL/hr at 23 0446, 0.5 mg/min at 23 0446 **FOLLOWED BY** amiodarone (CORDARONE) 450 mg in dextrose 5 % 250 mL infusion, 0.5 mg/min, IntraVENous, Continuous, Danae Mederos MD, Last Rate: 16.7 mL/hr at 23 0550, 0.5 mg/min at 23 0550    [Held by provider] apixaban (ELIQUIS) tablet 2.5 mg, 2.5 mg, Oral, BID, Danae Mederos MD    [Held by provider] clopidogrel (PLAVIX) tablet 75 mg, 75 mg, Oral, Daily, Danae Mederos MD    linezolid (ZYVOX) IVPB 600 mg, 600 mg, IntraVENous, Q12H, Danae Mederos MD, Stopped at 23 0144    sodium chloride flush 0.9 % injection 5-40 mL, 5-40 mL, IntraVENous, 2 times per day, Danae Mederos MD, 10 mL at 23 0851    sodium chloride flush 0.9 % injection 5-40 mL, 5-40 mL, IntraVENous, PRN, Danae Mederos MD    0.9 % sodium chloride infusion, , IntraVENous, PRN, Danae Mederos MD    ondansetron (ZOFRAN-ODT) disintegrating tablet 4 mg, 4 mg, Oral, Q8H PRN **OR** ondansetron (ZOFRAN) injection 4 mg, 4 mg, IntraVENous, Q6H PRN, Danae Mederos MD    polyethylene glycol (GLYCOLAX) packet 17 g, 17 g, Oral, Daily PRN, Danae Mederos MD    acetaminophen (TYLENOL) tablet 650 mg, 650 mg, Oral, Q6H PRN **OR** acetaminophen (TYLENOL) suppository 650 mg, 650 mg, Rectal, Q6H PRN, Danae Medreos MD         REVIEW OF SYSTEMS:     Review of systems not obtained due to patient factors - mental status found for: RPR  HIV:  No results found for: HIV  LIZ:  No results found for: ANATITER, LIZ  RF:  No results found for: RF  DSDNA:  No components found for: DNA  AMYLASE:  No results found for: AMYLASE  LIPASE:    Lab Results   Component Value Date/Time    LIPASE 63.0 08/22/2023 12:30 PM     Fibrinogen Level:  No components found for: FIB       BELOW MENTIONED RADIOLOGY STUDY RESULTS BY ME (AS NEEDED FOR MY EVALUATION AND MANAGEMENT). CT HEAD WO CONTRAST    Result Date: 8/22/2023  EXAMINATION: CT OF THE HEAD WITHOUT CONTRAST  8/22/2023 1:20 pm TECHNIQUE: CT of the head was performed without the administration of intravenous contrast. Automated exposure control, iterative reconstruction, and/or weight based adjustment of the mA/kV was utilized to reduce the radiation dose to as low as reasonably achievable. COMPARISON: None. HISTORY: ORDERING SYSTEM PROVIDED HISTORY: AMS TECHNOLOGIST PROVIDED HISTORY: Reason for exam:->AMS Has a \"code stroke\" or \"stroke alert\" been called? ->No Decision Support Exception - unselect if not a suspected or confirmed emergency medical condition->Emergency Medical Condition (MA) Reason for Exam: AMS FINDINGS: BRAIN/VENTRICLES: There is no acute intracranial hemorrhage, mass effect or midline shift. No abnormal extra-axial fluid collection. The gray-white differentiation is maintained without evidence of an acute infarct. There is no evidence of hydrocephalus. SINUSES: The visualized paranasal sinuses and mastoid air cells demonstrate no acute abnormality. Cerumen in the left external auditory canal. SOFT TISSUES/SKULL:  No acute abnormality of the visualized skull or soft tissues. No acute intracranial abnormality. Imaging: [unfilled]         ======================================================================  Please note that this chart entry has been generated using voice recognition software, mainly. So please excuse brevity and/or typos.   While every effort

## 2023-08-24 NOTE — CARE COORDINATION
Discharge Planning Note:    Received VM from Twyla Sierra at [facility] confirming pt is from LTC.     Electronically signed by Sebastian Hess RN on 8/24/2023 at 10:58 AM

## 2023-08-24 NOTE — PROGRESS NOTES
2100: Pt's HR going up to 160s, sustaining in 140s. Dr. Celeste Ackerman from cardiology notified. See new order for 500mL NS bolus.

## 2023-08-24 NOTE — CARE COORDINATION
[facility] Continence Nurse  Consult Note       NAME:  Bacilio Weber  MEDICAL RECORD NUMBER:  [id number]  AGE: 80 y.o. GENDER: male  : 1940  TODAY'S DATE:  2023    Subjective   Reason for WOCN Evaluation and Assessment: multiple wounds       Bacilio Weber is a 80 y.o. male referred by:   [x] Physician  [x] Nursing  [] Other:     Wound Identification:  Wound Type:  MASD, Intertrigo, DM ulcers, Pressure Injuries  Contributing Factors:  incontinent, right aka, wheelchair bound, diabetes     Wound History: present on admission, patient from SNF  Current Wound Care Treatment:  barrier cream, mepilex foam dressings.      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)     Benign prostatic hyperplasia     CHF (congestive heart failure) (HCC)     Chronic gout, unspecified, without tophus (tophi)     COPD (chronic obstructive pulmonary disease) ([address])     Diabetes mellitus due to underlying condition with diabetic neuropathic arthropathy (HCC)     Gastro-esophageal reflux disease without esophagitis     Hyperlipidemia     Hypertension     Longstanding persistent atrial fibrillation (HCC)     Malignant neoplasm of prostate (HCC)     Neuromuscular dysfunction of bladder, unspecified     Obstructive and reflux uropathy     Peripheral vascular disease, unspecified (HCC)     Polyneuropathy, unspecified     Unspecified atrial flutter ([address])     Unspecified protein-calorie malnutrition (HCC)        PAST SURGICAL HISTORY    Past Surgical History:   Procedure Laterality Date    OTHER SURGICAL HISTORY      acquired abscence of right leg below knee       FAMILY HISTORY    Family History   Family history unknown: Yes       SOCIAL HISTORY    Social History     Tobacco Use    Smoking status: Some Days     Types: Cigarettes   Vaping Use    Vaping Use: Unknown Assessment:  Severity:  0 / 10  Quality of pain: N/A  Wound Pain Timing/Severity: none  Premedicated: No    Plan   Plan of Care: Wound 08/22/23 Penis Anterior;Left;Mid Nguyen in place and lays in wound.-Dressing/Treatment: Triad hydro/zinc oxide-based hydrophilic paste  Wound 51/59/92 Penis Anterior;Mid skin tear to anterior aspect of penis-Dressing/Treatment: Triad hydro/zinc oxide-based hydrophilic paste  Wound 18/77/76 Sacrum Stage 3 pressure injury in addition to contact irritant dermatitis due to incontinence-Dressing/Treatment: Triad hydro/zinc oxide-based hydrophilic paste  Wound 24/53/54 Groin Right-Dressing/Treatment: Interdry Ag/wicking fabric with Ag  Wound 08/22/23 Heel Left unstageable pressure injury-Dressing/Treatment: Foam  Treat dry skin on leg with dermaphor ointment twice daily. Specialty Bed Required : no patient on critical care bed    [] Low Air Loss   [] Pressure Redistribution  [] Fluid Immersion  [] Bariatric  [] Total Pressure Relief  [] Other:     Current Diet: Diet NPO  Dietician consult:  Yes    Discharge Plan:  Placement for patient upon discharge: skilled nursing    Patient appropriate for Outpatient [address]: Yes    Referrals:  []   [] [facility]  [] Supplies  [] Other    Patient/Caregiver Teaching: patient incoherent, did not open eyes, nursing do personal care.    Level of patient/caregiver understanding able to:   [] Indicates understanding       [] Needs reinforcement  [] Unsuccessful      [] Verbal Understanding  [] Demonstrated understanding       [x] No evidence of learning  [] Refused teaching         [] N/A       Electronically signed by  ADA Baldwin, RN, [facility]  [phone] on 8/24/2023 at 11:53 AM

## 2023-08-24 NOTE — PROGRESS NOTES
PULMONARY AND CRITICAL CARE MEDICINE PROGRESS NOTE    Subjective: Patient remains critically ill. Mentation remains poor. Currently on Levophed at 8 mcg/min. Remains in A-fib with RVR. Tachycardic and hypotensive. Renal functions remain poor. REVIEW OF SYSTEMS:   14 point ROS could not be obtained due to patient factors. Minimally verbal.      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)     Benign prostatic hyperplasia     CHF (congestive heart failure) ([facility])     Chronic gout, unspecified, without tophus (tophi)     COPD (chronic obstructive pulmonary disease) ([address])     Diabetes mellitus due to underlying condition with diabetic neuropathic arthropathy ([facility])     Gastro-esophageal reflux disease without esophagitis     Hyperlipidemia     Hypertension     Longstanding persistent atrial fibrillation ([facility])     Malignant neoplasm of prostate ([facility])     Neuromuscular dysfunction of bladder, unspecified     Obstructive and reflux uropathy     Peripheral vascular disease, unspecified ([facility])     Polyneuropathy, unspecified     Unspecified atrial flutter ([address])     Unspecified protein-calorie malnutrition ([address])        PAST SURGICAL HISTORY:   Past Surgical History:   Procedure Laterality Date    OTHER SURGICAL HISTORY      acquired abscence of right leg below knee       SOCIAL HISTORY:   Social History     Tobacco Use    Smoking status: Some Days     Types: Cigarettes   Vaping Use    Vaping Use: Unknown   Substance Use Topics    Alcohol use: Defer    Drug use: Defer       FAMILY HISTORY:   Family History   Family history unknown: Yes       MEDICATIONS:     No current facility-administered medications on file prior to encounter.      Current Outpatient Medications on File Prior to Encounter   Medication Sig Dispense Refill    Lactobacillus (ACIDOPHILUS) 100 MG CAPS Take 1 capsule by mouth daily      allopurinol (ZYLOPRIM) 300 MG tablet Take 1 tablet

## 2023-08-24 NOTE — PROGRESS NOTES
General appearance: No apparent distress, appears stated age and cooperative. HEENT: Pupils equal, round, and reactive to light. Conjunctivae/corneas clear. Neck: Supple, with full range of motion. No jugular venous distention. Trachea midline. Respiratory:  Normal respiratory effort. Clear to auscultation, bilaterally without Rales/Wheezes/Rhonchi. Cardiovascular: Regular rate and rhythm with normal S1/S2 without murmurs, rubs or gallops. Abdomen: Soft, non-tender, non-distended with normal bowel sounds. Musculoskeletal: No clubbing, cyanosis or edema bilaterally. Full range of motion without deformity. Skin: Skin color, texture, turgor normal.  No rashes or lesions. Neurologic:  Neurovascularly intact without any focal sensory/motor deficits.  Cranial nerves: II-XII intact, grossly non-focal.  Psychiatric: Alert and oriented, thought content appropriate, normal insight  Capillary Refill: Brisk, 3 seconds, normal   Peripheral Pulses: +2 palpable, equal bilaterally       Labs:   Recent Labs     08/22/23  1230 08/23/23  0545   WBC 30.5* 33.8*   HGB 13.1* 11.5*   HCT 41.2 35.8*    428     Recent Labs     08/22/23  1724 08/23/23  0545 08/23/23  1220    145 142   K 4.9 4.1 3.7    103 103   CO2 15* 24 25   * 127* 110*   CREATININE 6.4* 6.5* 6.2*   CALCIUM 9.5 9.0 8.3   PHOS  --  3.8  --      Recent Labs     08/22/23  1230 08/23/23  0545   AST 22 19   ALT 10 10   BILIDIR  --  <0.2   BILITOT 0.5 0.4   ALKPHOS 94 95     Recent Labs     08/22/23  1230   INR 2.45*     Recent Labs     08/22/23  1230 08/22/23  2205   CKTOTAL 235  --    TROPHS 108*  105* 142*       Urinalysis:      Lab Results   Component Value Date/Time    NITRU Negative 08/22/2023 12:30 PM    WBCUA  08/22/2023 12:30 PM    BACTERIA 2+ 08/22/2023 12:30 PM    RBCUA 21-50 08/22/2023 12:30 PM    BLOODU LARGE 08/22/2023 12:30 PM    SPECGRAV 1.025 08/22/2023 12:30 PM    GLUCOSEU 100 08/22/2023 12:30 PM       Radiology:  XR CHEST PORTABLE   Final Result   Right IJ catheter tip is in the right atrium and can be retracted by 6-7 cm. XR CHEST PORTABLE   Final Result   1. Prominent bilateral interstitial lung markings are present without focal   consolidation. 2. Bibasilar atelectasis. XR FOOT LEFT (MIN 3 VIEWS)   Final Result   Soft tissue ulceration at the posterior calcaneus with indistinct bony margin   of the posterior calcaneus, possibly early osteomyelitis. CT HEAD WO CONTRAST   Final Result   No acute intracranial abnormality. US RENAL COMPLETE    (Results Pending)       IP CONSULT TO NEPHROLOGY  IP CONSULT TO CARDIOLOGY  IP CONSULT TO CRITICAL CARE  IP CONSULT TO INFECTIOUS DISEASES  IP CONSULT TO PALLIATIVE CARE  IP CONSULT TO PODIATRY  IP CONSULT TO SOCIAL WORK  IP CONSULT TO PALLIATIVE CARE    Assessment/Plan:    Active Hospital Problems    Diagnosis     Acute renal failure ([address]) [N17.9]     Atrial fibrillation with RVR ([address]) [I48.91]     Osteomyelitis (HCC) [M86.9]     Urinary tract infection associated with indwelling urethral catheter ([address]) [T83.511A, N39.0]     Overweight (BMI 25.0-29. 9) [E66.3]     Mixed hyperlipidemia [E78.2]     Chronic atrial fibrillation (HCC) [I48.20]     Benign prostatic hyperplasia with lower urinary tract symptoms [N40.1]     Essential hypertension [I10]     Chronic obstructive pulmonary disease (HCC) [J44.9]     CRP elevated [R79.82]     Elevated erythrocyte sedimentation rate [R70.0]     High anion gap metabolic acidosis [N53.80]     Type 2 diabetes mellitus with left diabetic foot infection (HCC) [O82.772, L08.9]     Septicemia (HCC) [A41.9]     Septic shock (HCC) [A41.9, R65.21]      Septic shock secondary to UTI vs osteo of left foot  - iv atbx, growing enerococcus from the urine  - cutlures of foot wound pending but imaging is converning for osteomyelitis, ID is following and he is on zyvox and zosyn  - bolus ivf  - levophed for MAP>65  - bmp and cbc in am  -

## 2023-08-24 NOTE — DISCHARGE INSTR - COC
Continuity of Care Form    Patient Name: Magdalena Diaz   :  89  MRN:  [id number]    Admit date:  2023  Discharge date:  ***    Code Status Order: DNR-CC   Advance Directives:     Admitting Physician:  Brando Snow MD  PCP: No primary care provider on file. Discharging Nurse: [facility] Unit/Room#: UNV-9262/3403-24  Discharging Unit Phone Number: ***    Emergency Contact:   Extended Emergency Contact Information  Primary Emergency Contact: [address] [facility] Phone: [phone]  Relation: Spouse  Secondary Emergency Contact:  [address] Phone: [phone]  Relation: Child    Past Surgical History:  Past Surgical History:   Procedure Laterality Date    OTHER SURGICAL HISTORY      acquired abscence of right leg below knee       Immunization History: There is no immunization history on file for this patient. Active Problems:  Patient Active Problem List   Diagnosis Code    Septic shock ([facility]) A41.9, R65.21    Acute renal failure ([address]) N17.9    Atrial fibrillation with RVR ([facility]) I48.91    Osteomyelitis ([facility]) M86.9    Urinary tract infection associated with indwelling urethral catheter ([facility]) T83.511A, N39.0    Overweight (BMI 25.0-29. 9) E66.3    Mixed hyperlipidemia E78.2    Chronic atrial fibrillation ([facility]) I48.20    Benign prostatic hyperplasia with lower urinary tract symptoms N40.1    Essential hypertension I10    Chronic obstructive pulmonary disease ([facility]) J44.9    CRP elevated R79.82    Elevated erythrocyte sedimentation rate R70.0    High anion gap metabolic acidosis Z97.98    Type 2 diabetes mellitus with left diabetic foot infection ([facility]) E11.628, L08.9    Septicemia ([facility]) A41.9       Isolation/Infection:   Isolation            Contact          Patient Infection Status       Infection Onset Added Last Indicated Last Indicated By Review Planned Expiration Resolved Resolved By    None active    Resolved    COVID-19 (Rule Out) 23 COVID-19, Rapid

## 2023-08-24 NOTE — CARE COORDINATION
Discharge Planning Note:    Dr. Orestes Pereyra informed this CM that patient's family reports having a 3pm family meeting scheduled with Yue and would like to make that sooner if possible. CM called Brook Mackenzie in intake at [facility] who reports the appointment is with Ana Maria. Brook Mackenzie is checking to see if Tony Mano is available sooner than 3pm.      Received call back from Fl[place] at [place] who reports Tony Mano will be here around 1:30pm.  CM notified family at bedside. Electronically signed by Constantino Pennington RN on 8/24/2023 at 12:29 PM    Update    Family met with Yue Rodgers and hav signed with goal of getting pt back to [facility] of  with hospice support. Once comfort care initiated and all drips removed, will need to ensure stable for transport, will need to GIP for the night and re-assess for transport in the morning. SHAHEEN has been in touch with Michael Tai at [facility] following to determine when the 1-time contract with Yue is completed for pt's return to facility with hospice services. Family went to go get patient's wife from [facility] so she can be with patient for a while tonight.       Electronically signed by Constantino Pennington RN on 8/24/2023 at 3:22 PM    Electronically signed by Constantino Pennington RN on 8/24/2023 at 3:22 PM

## 2023-08-24 NOTE — PROGRESS NOTES
Nutrition Note    RECOMMENDATIONS  PO Diet: NPO  ONS: NPO  Nutrition Support: Remain available to provide recommendations should family decide upon aggressive care. NUTRITION ASSESSMENT   Pt seen during IPOC critical care rounds. Pt triggered positive nursing nutrition screen for wounds. Pt with multiple stage III and unstageable wounds. Requiring levophed at 7 mcg/min. Pt has been too lethargic to safely give PO. Per palliative care RN, appears family is moving towards comfort care. Hospice consult today. Nutrition Related Findings: Oriented to person. Lethargic. Wounds: Stage III, Unstageable, Pressure Injury, Multiple  Nutrition Education:  Education not indicated   Nutrition Goals: other (specify) (Nutrition as appropriate with goals of care)     MALNUTRITION ASSESSMENT   Malnutrition Status: Insufficient data    NUTRITION DIAGNOSIS   Increased nutrient needs related to increase demand for energy/nutrients as evidenced by wounds    CURRENT NUTRITION THERAPIES  Diet NPO     PO Intake: NPO   PO Supplement Intake:NPO    ANTHROPOMETRICS  Current Height: 5' 7\" (170.2 cm)  Current Weight - Scale: 184 lb 1.4 oz (83.5 kg)    Ideal Body Weight (IBW): 148 lbs  (67 kg)        BMI: 28.8    COMPARATIVE STANDARDS  Total Energy Requirements (kcals/day): 6007-3783     Protein (g):  101-134 grams       Fluid (mL/day):  9414-1198 mL    The patient will be monitored per nutrition standards of care. Consult dietitian if additional nutrition interventions are needed prior to RD reassessment.      Dave Higgins MS, [address] [facility]    Contact: [phone]

## 2023-08-25 VITALS
RESPIRATION RATE: 10 BRPM | OXYGEN SATURATION: 93 % | BODY MASS INDEX: 29 KG/M2 | HEART RATE: 92 BPM | TEMPERATURE: 97.7 F | SYSTOLIC BLOOD PRESSURE: 50 MMHG | DIASTOLIC BLOOD PRESSURE: 36 MMHG | WEIGHT: 185.19 LBS

## 2023-08-25 PROCEDURE — 6360000002 HC RX W HCPCS: Performed by: INTERNAL MEDICINE

## 2023-08-25 RX ADMIN — LORAZEPAM 4 MG: 2 INJECTION INTRAMUSCULAR; INTRAVENOUS at 05:49

## 2023-08-25 RX ADMIN — MORPHINE SULFATE 4 MG: 4 INJECTION, SOLUTION INTRAMUSCULAR; INTRAVENOUS at 04:08

## 2023-08-25 RX ADMIN — LORAZEPAM 4 MG: 2 INJECTION INTRAMUSCULAR; INTRAVENOUS at 02:30

## 2023-08-25 RX ADMIN — LORAZEPAM 4 MG: 2 INJECTION INTRAMUSCULAR; INTRAVENOUS at 14:52

## 2023-08-25 RX ADMIN — MORPHINE SULFATE 4 MG: 4 INJECTION, SOLUTION INTRAMUSCULAR; INTRAVENOUS at 12:44

## 2023-08-25 RX ADMIN — MORPHINE SULFATE 4 MG: 4 INJECTION, SOLUTION INTRAMUSCULAR; INTRAVENOUS at 01:35

## 2023-08-25 RX ADMIN — MORPHINE SULFATE 4 MG: 4 INJECTION, SOLUTION INTRAMUSCULAR; INTRAVENOUS at 08:15

## 2023-08-25 ASSESSMENT — PAIN SCALES - GENERAL
PAINLEVEL_OUTOF10: 0
PAINLEVEL_OUTOF10: 0
PAINLEVEL_OUTOF10: 1
PAINLEVEL_OUTOF10: 0

## 2023-08-25 NOTE — PROGRESS NOTES
Cardiac monitoring taking off at request of family. Patient repositioned. Patient does not appear to be in discomfort or distress.

## 2023-08-25 NOTE — PROGRESS NOTES
Per Family request (After Grand-daughter arrival from [place]), IV infusions (Levophed, Amiodarone, and NS) turned off at this time. PRN 4 mg IV Morphine administered for agitation.

## 2023-08-25 NOTE — PROGRESS NOTES
MD Anil Cooper MD Verne Fells, MD                  Office: [phone]                      Fax: [phone] [address] [employer] [employer]                   NEPHROLOGY INPATIENT PROGRESS NOTE:     PATIENT NAME: Nati Mulligan  : 1940  MRN: [id number]      RECOMMENDATIONS:     I noted plans for hospice , as per palliative care   \"She wants to switch to comfort care focus with plan for Hospice and return back to LTC \"  I will discontinue order for kidney scan  Can stop IV fluids  Can use diuretics Lasix as needed per hospice for comfort. I will sign off          - Hyperkalemia improved with medical management, given IV calcium too     - Still needs aggressive IV fluids,  - Refer to the orders for, for normal saline, changed from bicarb infusion  - keep NS  -Echo without any major heart failure, so he will be able to tolerate IV fluids    - Levophed, to keep SBP >90 or MAP >65*  - Hold home Lasix, spironolactone, amlodipine    -Avoiding gentamicin.   - levels were higher   -Empiric antibiotics,- IV antibiotic: Vancomycin: trough goal <15, pharmacy team assisting.   -Follow-up blood culture, urine culture    -phelps insertion needed for strict monitoring in critically ill patient    -no need for dialysis,     - DNR-CC   -long term prognosis and QOL likely poor    -at higher risk for decompensation, needing closer monitoring. D/C plan from renal stand point:  - likely ~prolonged recovery      IMPRESSION:       Admitted on:  2023  4:48 PM   For:  Sepsis ([address]) [A41.9]      EDWARD severe on admission - slowly Improving  Oliguric -> non-oliguric now     - BL Scr-unclear, as no previous labs available to review, so unclear if any CKD  -> 6.0 creatinine on admission    - Etiology of EDWARD -presumably has developed ATN in the setting of sepsis, hypotension, hypovolemia,    + Gentamicin use (as per pharmacist- \"Of note, patient is on gentamicin 80mg IM TID x10 days, to end on . \"   +

## 2023-08-25 NOTE — CARE COORDINATION
Discharge Planning Note:    CM has called [facility] to make contact with [facility]. Informed Jose Vázquez is off today and I needed to speak to \"the team\". Intake dept. transferred this CM to João who was not aware someone was to be out here this AM for DC planning back to facility. Iris Edwards will coordinate to get someone out here asap, she will call me back shortly once she has a confirmed plan. Savanna updated on above. Electronically signed by Opal Guido RN on 8/25/2023 at 10:47 AM      Received call back from Iris Edwards at [facility], reporting she will be on site to coordinate transport back to [facility].      Electronically signed by Opal Guido RN on 8/25/2023 at 11:05 AM

## 2023-08-25 NOTE — PLAN OF CARE
Problem: Chronic Conditions and Co-morbidities  Goal: Patient's chronic conditions and co-morbidity symptoms are monitored and maintained or improved  8/25/2023 1511 by Derek Lipscomb RN  Outcome: Adequate for Discharge  8/25/2023 1511 by Derek Lipscomb RN  Outcome: Adequate for Discharge     Problem: Discharge Planning  Goal: Discharge to home or other facility with appropriate resources  8/25/2023 1511 by Derek Lipscomb RN  Outcome: Adequate for Discharge  8/25/2023 1511 by Derek Lipscomb RN  Outcome: Adequate for Discharge    Patient discharged with hospice back to facility.       Problem: Safety - Adult  Goal: Free from fall injury  8/25/2023 1511 by Derek Lipscomb RN  Outcome: Adequate for Discharge  8/25/2023 1511 by Derek Lipscomb RN  Outcome: Adequate for Discharge     Problem: Pain  Goal: Verbalizes/displays adequate comfort level or baseline comfort level  8/25/2023 1511 by Derek Lipscomb RN  Outcome: Adequate for Discharge  8/25/2023 1511 by Derek Lipscomb RN  Outcome: Adequate for Discharge

## 2023-08-26 LAB
BACTERIA BLD CULT ORG #2: NORMAL
BACTERIA BLD CULT: NORMAL
BACTERIA SPEC AEROBE CULT: ABNORMAL
BACTERIA SPEC AEROBE CULT: ABNORMAL
GRAM STN SPEC: ABNORMAL
ORGANISM: ABNORMAL
ORGANISM: ABNORMAL

## 2023-09-07 LAB
BACTERIA SPEC AEROBE CULT: ABNORMAL
BACTERIA SPEC AEROBE CULT: ABNORMAL
GRAM STN SPEC: ABNORMAL
ORGANISM: ABNORMAL
ORGANISM: ABNORMAL

## (undated) PROCEDURE — 02HV33Z INSERTION OF INFUSION DEVICE INTO SUPERIOR VENA CAVA, PERCUTANEOUS APPROACH: ICD-10-PCS